# Patient Record
Sex: MALE | Race: WHITE | NOT HISPANIC OR LATINO
[De-identification: names, ages, dates, MRNs, and addresses within clinical notes are randomized per-mention and may not be internally consistent; named-entity substitution may affect disease eponyms.]

---

## 2017-03-01 ENCOUNTER — RX RENEWAL (OUTPATIENT)
Age: 57
End: 2017-03-01

## 2017-04-27 ENCOUNTER — APPOINTMENT (OUTPATIENT)
Dept: CARDIOLOGY | Facility: CLINIC | Age: 57
End: 2017-04-27

## 2017-04-27 LAB — INR PPP: 2.8

## 2017-05-01 VITALS — HEIGHT: 71 IN | HEART RATE: 67 BPM | BODY MASS INDEX: 40.32 KG/M2 | WEIGHT: 288 LBS

## 2017-05-25 ENCOUNTER — APPOINTMENT (OUTPATIENT)
Dept: CARDIOLOGY | Facility: CLINIC | Age: 57
End: 2017-05-25

## 2017-05-25 VITALS — BODY MASS INDEX: 40.32 KG/M2 | WEIGHT: 288 LBS | OXYGEN SATURATION: 96 % | HEIGHT: 71 IN

## 2017-05-25 LAB
INR PPP: 2 RATIO
QUALITY CONTROL: YES

## 2017-06-22 ENCOUNTER — APPOINTMENT (OUTPATIENT)
Dept: CARDIOLOGY | Facility: CLINIC | Age: 57
End: 2017-06-22

## 2017-06-22 LAB — INR PPP: 2.3 RATIO

## 2017-07-24 ENCOUNTER — APPOINTMENT (OUTPATIENT)
Dept: CARDIOLOGY | Facility: CLINIC | Age: 57
End: 2017-07-24

## 2017-07-24 VITALS — WEIGHT: 288 LBS | BODY MASS INDEX: 40.32 KG/M2 | HEIGHT: 71 IN | HEART RATE: 67 BPM

## 2017-07-24 LAB
INR PPP: 2.4 RATIO
QUALITY CONTROL: YES

## 2017-08-28 ENCOUNTER — APPOINTMENT (OUTPATIENT)
Dept: CARDIOLOGY | Facility: CLINIC | Age: 57
End: 2017-08-28
Payer: COMMERCIAL

## 2017-08-28 VITALS — HEIGHT: 71 IN | WEIGHT: 288 LBS | BODY MASS INDEX: 40.32 KG/M2 | HEART RATE: 67 BPM

## 2017-08-28 LAB
INR PPP: 2.1 RATIO
QUALITY CONTROL: YES

## 2017-08-28 PROCEDURE — 99211 OFF/OP EST MAY X REQ PHY/QHP: CPT

## 2017-08-28 PROCEDURE — 85610 PROTHROMBIN TIME: CPT | Mod: QW

## 2017-09-18 ENCOUNTER — RX RENEWAL (OUTPATIENT)
Age: 57
End: 2017-09-18

## 2017-09-25 ENCOUNTER — APPOINTMENT (OUTPATIENT)
Dept: CARDIOLOGY | Facility: CLINIC | Age: 57
End: 2017-09-25

## 2017-12-19 ENCOUNTER — RX RENEWAL (OUTPATIENT)
Age: 57
End: 2017-12-19

## 2017-12-20 ENCOUNTER — RX RENEWAL (OUTPATIENT)
Age: 57
End: 2017-12-20

## 2017-12-29 ENCOUNTER — APPOINTMENT (OUTPATIENT)
Dept: CARDIOLOGY | Facility: CLINIC | Age: 57
End: 2017-12-29
Payer: COMMERCIAL

## 2017-12-29 ENCOUNTER — NON-APPOINTMENT (OUTPATIENT)
Age: 57
End: 2017-12-29

## 2017-12-29 VITALS — BODY MASS INDEX: 41.16 KG/M2 | HEIGHT: 71 IN | WEIGHT: 294 LBS | HEART RATE: 73 BPM

## 2017-12-29 VITALS
HEART RATE: 73 BPM | SYSTOLIC BLOOD PRESSURE: 198 MMHG | BODY MASS INDEX: 41.16 KG/M2 | DIASTOLIC BLOOD PRESSURE: 107 MMHG | OXYGEN SATURATION: 100 % | HEIGHT: 71 IN | WEIGHT: 294 LBS

## 2017-12-29 LAB
INR PPP: 2.3 RATIO
QUALITY CONTROL: YES

## 2017-12-29 PROCEDURE — 85610 PROTHROMBIN TIME: CPT | Mod: QW

## 2017-12-29 PROCEDURE — 93000 ELECTROCARDIOGRAM COMPLETE: CPT

## 2017-12-29 PROCEDURE — 99215 OFFICE O/P EST HI 40 MIN: CPT

## 2018-02-12 ENCOUNTER — RX RENEWAL (OUTPATIENT)
Age: 58
End: 2018-02-12

## 2018-02-14 ENCOUNTER — NON-APPOINTMENT (OUTPATIENT)
Age: 58
End: 2018-02-14

## 2018-02-14 ENCOUNTER — APPOINTMENT (OUTPATIENT)
Dept: CARDIOLOGY | Facility: CLINIC | Age: 58
End: 2018-02-14
Payer: COMMERCIAL

## 2018-02-14 VITALS
HEIGHT: 71 IN | SYSTOLIC BLOOD PRESSURE: 176 MMHG | BODY MASS INDEX: 41.58 KG/M2 | DIASTOLIC BLOOD PRESSURE: 83 MMHG | HEART RATE: 65 BPM | WEIGHT: 297 LBS | OXYGEN SATURATION: 97 %

## 2018-02-14 VITALS — HEIGHT: 71 IN | BODY MASS INDEX: 41.58 KG/M2 | HEART RATE: 65 BPM | WEIGHT: 297 LBS

## 2018-02-14 DIAGNOSIS — R60.0 LOCALIZED EDEMA: ICD-10-CM

## 2018-02-14 LAB
INR PPP: 2.1 RATIO
QUALITY CONTROL: YES

## 2018-02-14 PROCEDURE — 99215 OFFICE O/P EST HI 40 MIN: CPT

## 2018-02-14 PROCEDURE — 93000 ELECTROCARDIOGRAM COMPLETE: CPT

## 2018-02-14 PROCEDURE — 85610 PROTHROMBIN TIME: CPT | Mod: QW

## 2018-02-28 ENCOUNTER — NON-APPOINTMENT (OUTPATIENT)
Age: 58
End: 2018-02-28

## 2018-02-28 ENCOUNTER — APPOINTMENT (OUTPATIENT)
Dept: CARDIOLOGY | Facility: CLINIC | Age: 58
End: 2018-02-28
Payer: COMMERCIAL

## 2018-02-28 VITALS
HEART RATE: 69 BPM | DIASTOLIC BLOOD PRESSURE: 80 MMHG | BODY MASS INDEX: 41.3 KG/M2 | WEIGHT: 295 LBS | OXYGEN SATURATION: 96 % | HEIGHT: 71 IN | SYSTOLIC BLOOD PRESSURE: 170 MMHG

## 2018-02-28 PROCEDURE — 93000 ELECTROCARDIOGRAM COMPLETE: CPT

## 2018-02-28 PROCEDURE — 99215 OFFICE O/P EST HI 40 MIN: CPT

## 2018-03-14 ENCOUNTER — APPOINTMENT (OUTPATIENT)
Dept: CARDIOLOGY | Facility: CLINIC | Age: 58
End: 2018-03-14

## 2018-03-26 ENCOUNTER — APPOINTMENT (OUTPATIENT)
Dept: CARDIOLOGY | Facility: CLINIC | Age: 58
End: 2018-03-26
Payer: COMMERCIAL

## 2018-03-26 VITALS — BODY MASS INDEX: 41.3 KG/M2 | WEIGHT: 295 LBS | HEART RATE: 69 BPM | HEIGHT: 71 IN

## 2018-03-26 LAB
INR PPP: 2.7 RATIO
QUALITY CONTROL: YES

## 2018-03-26 PROCEDURE — 85610 PROTHROMBIN TIME: CPT | Mod: QW

## 2018-03-26 PROCEDURE — 99211 OFF/OP EST MAY X REQ PHY/QHP: CPT

## 2018-04-17 ENCOUNTER — RX RENEWAL (OUTPATIENT)
Age: 58
End: 2018-04-17

## 2018-04-23 ENCOUNTER — APPOINTMENT (OUTPATIENT)
Dept: CARDIOLOGY | Facility: CLINIC | Age: 58
End: 2018-04-23

## 2018-06-25 ENCOUNTER — RX RENEWAL (OUTPATIENT)
Age: 58
End: 2018-06-25

## 2018-07-02 ENCOUNTER — APPOINTMENT (OUTPATIENT)
Dept: CARDIOLOGY | Facility: CLINIC | Age: 58
End: 2018-07-02
Payer: COMMERCIAL

## 2018-07-02 VITALS — WEIGHT: 295 LBS | BODY MASS INDEX: 41.3 KG/M2 | HEART RATE: 69 BPM | HEIGHT: 71 IN

## 2018-07-02 LAB
INR PPP: 2.7 RATIO
QUALITY CONTROL: YES

## 2018-07-02 PROCEDURE — 85610 PROTHROMBIN TIME: CPT | Mod: QW

## 2018-07-02 PROCEDURE — 93793 ANTICOAG MGMT PT WARFARIN: CPT

## 2018-07-05 ENCOUNTER — MEDICATION RENEWAL (OUTPATIENT)
Age: 58
End: 2018-07-05

## 2018-08-02 ENCOUNTER — APPOINTMENT (OUTPATIENT)
Dept: CARDIOLOGY | Facility: CLINIC | Age: 58
End: 2018-08-02

## 2018-08-30 ENCOUNTER — APPOINTMENT (OUTPATIENT)
Dept: CARDIOLOGY | Facility: CLINIC | Age: 58
End: 2018-08-30
Payer: COMMERCIAL

## 2018-08-30 PROCEDURE — 85610 PROTHROMBIN TIME: CPT | Mod: QW

## 2018-08-30 PROCEDURE — 93793 ANTICOAG MGMT PT WARFARIN: CPT

## 2018-08-31 VITALS — BODY MASS INDEX: 41.3 KG/M2 | HEIGHT: 71 IN | HEART RATE: 69 BPM | WEIGHT: 295 LBS

## 2018-08-31 LAB
INR PPP: 2.5 RATIO
QUALITY CONTROL: YES

## 2018-09-26 ENCOUNTER — APPOINTMENT (OUTPATIENT)
Dept: CARDIOLOGY | Facility: CLINIC | Age: 58
End: 2018-09-26

## 2018-10-10 ENCOUNTER — APPOINTMENT (OUTPATIENT)
Dept: CARDIOLOGY | Facility: CLINIC | Age: 58
End: 2018-10-10
Payer: COMMERCIAL

## 2018-10-10 VITALS — BODY MASS INDEX: 41.3 KG/M2 | HEIGHT: 71 IN | HEART RATE: 69 BPM | WEIGHT: 295 LBS

## 2018-10-10 LAB
INR PPP: 2.4 RATIO
QUALITY CONTROL: YES

## 2018-10-10 PROCEDURE — 93793 ANTICOAG MGMT PT WARFARIN: CPT

## 2018-10-10 PROCEDURE — 85610 PROTHROMBIN TIME: CPT | Mod: QW

## 2018-10-11 ENCOUNTER — MEDICATION RENEWAL (OUTPATIENT)
Age: 58
End: 2018-10-11

## 2018-11-07 ENCOUNTER — APPOINTMENT (OUTPATIENT)
Dept: CARDIOLOGY | Facility: CLINIC | Age: 58
End: 2018-11-07

## 2018-11-19 ENCOUNTER — APPOINTMENT (OUTPATIENT)
Dept: CARDIOLOGY | Facility: CLINIC | Age: 58
End: 2018-11-19
Payer: COMMERCIAL

## 2018-11-19 LAB
INR PPP: 2.3 RATIO
QUALITY CONTROL: YES

## 2018-11-19 PROCEDURE — 85610 PROTHROMBIN TIME: CPT | Mod: QW

## 2018-11-19 PROCEDURE — 93793 ANTICOAG MGMT PT WARFARIN: CPT

## 2018-12-19 ENCOUNTER — APPOINTMENT (OUTPATIENT)
Dept: CARDIOLOGY | Facility: CLINIC | Age: 58
End: 2018-12-19

## 2018-12-28 ENCOUNTER — APPOINTMENT (OUTPATIENT)
Dept: CARDIOLOGY | Facility: CLINIC | Age: 58
End: 2018-12-28
Payer: COMMERCIAL

## 2018-12-28 VITALS — HEIGHT: 71 IN | WEIGHT: 295 LBS | BODY MASS INDEX: 41.3 KG/M2 | HEART RATE: 69 BPM

## 2018-12-28 LAB
INR PPP: 1.8 RATIO
QUALITY CONTROL: YES

## 2018-12-28 PROCEDURE — 93793 ANTICOAG MGMT PT WARFARIN: CPT

## 2018-12-28 PROCEDURE — 85610 PROTHROMBIN TIME: CPT | Mod: QW

## 2019-01-03 ENCOUNTER — RX RENEWAL (OUTPATIENT)
Age: 59
End: 2019-01-03

## 2019-01-16 ENCOUNTER — APPOINTMENT (OUTPATIENT)
Dept: CARDIOLOGY | Facility: CLINIC | Age: 59
End: 2019-01-16
Payer: COMMERCIAL

## 2019-01-16 LAB
INR PPP: 3.6 RATIO
QUALITY CONTROL: YES

## 2019-01-16 PROCEDURE — 93793 ANTICOAG MGMT PT WARFARIN: CPT

## 2019-01-16 PROCEDURE — 85610 PROTHROMBIN TIME: CPT | Mod: QW

## 2019-01-23 ENCOUNTER — NON-APPOINTMENT (OUTPATIENT)
Age: 59
End: 2019-01-23

## 2019-01-23 ENCOUNTER — APPOINTMENT (OUTPATIENT)
Dept: CARDIOLOGY | Facility: CLINIC | Age: 59
End: 2019-01-23
Payer: COMMERCIAL

## 2019-01-23 VITALS
WEIGHT: 289 LBS | SYSTOLIC BLOOD PRESSURE: 192 MMHG | HEIGHT: 71 IN | HEART RATE: 75 BPM | OXYGEN SATURATION: 99 % | BODY MASS INDEX: 40.46 KG/M2 | DIASTOLIC BLOOD PRESSURE: 80 MMHG

## 2019-01-23 PROCEDURE — 93000 ELECTROCARDIOGRAM COMPLETE: CPT

## 2019-01-23 PROCEDURE — 99215 OFFICE O/P EST HI 40 MIN: CPT

## 2019-01-23 RX ORDER — LOSARTAN POTASSIUM 50 MG/1
50 TABLET, FILM COATED ORAL
Qty: 90 | Refills: 3 | Status: DISCONTINUED | COMMUNITY
Start: 2017-12-29 | End: 2019-01-23

## 2019-01-23 RX ORDER — FUROSEMIDE 80 MG/1
80 TABLET ORAL
Qty: 90 | Refills: 3 | Status: DISCONTINUED | COMMUNITY
Start: 2018-02-14 | End: 2018-11-23

## 2019-01-23 RX ORDER — LOSARTAN POTASSIUM 50 MG/1
50 TABLET, FILM COATED ORAL
Qty: 90 | Refills: 3 | Status: DISCONTINUED | COMMUNITY
Start: 2018-06-25 | End: 2019-01-23

## 2019-01-23 NOTE — CARDIOLOGY SUMMARY
[No Ischemia] : no Ischemia [LVEF ___%] : LVEF [unfilled]% [None] : no pulmonary hypertension [Enlarged] : enlarged LA size [Mild] : mild mitral regurgitation [___] : [unfilled]

## 2019-01-23 NOTE — HISTORY OF PRESENT ILLNESS
[FreeTextEntry1] : Thierno Hassan presented to the office today for a followup evaluation. He was last seen in the office 1 year ago.\par \par He is now 58 years old, with a history of multiple medical problems. He has a history of coronary artery disease, status post PCI as well as coronary bypass. His coronary bypass was in 2007, and he has had PCI in 2009, with a bare-metal stents placed to the saphenous vein graft to his ramus intermedius. He has a history of paroxysmal atrial fibrillation, generally maintained in sinus rhythm on warfarin and sotalol. In 2016, he had increasingly frequent episodes of atrial fibrillation, but did not stabilize. He continued to have an intermittently tachycardic ventricular response despite the addition of diltiazem, which he did not like very much. Eventually, he was admitted to the hospital, and was referred to [facility] where he had pulmonary vein isolation.\par \par Because of recurrent tachycardia, he was started on diltiazem. Over time, he developed atrial flutter. This was relatively frequent initially after his ablation, but had seemingly become less frequent. He had symptoms suggesting tachy lara syndrome, but he never went to electrophysiology. This went away on its own, and he now refuses to take diltiazem because he feels that this caused his issues overall.\par \par He has been going to wound care over the last several months because of wounds on both legs. They seem to be healing. They have noticed that his blood pressure is elevated. It has been in the range of 170 at least.\par \par Thierno reports a degree of chest tightness when he exerts himself in the cold weather. This is really nothing new. It is certainly something that has been consistent with angina, over a period of time. He has a tendency toward edema, likely on the basis of venous insufficiency. He has been only partially compliant with his medications, as he has been taking half doses of sotalol twice daily. He does not really report much in the way of palpitations, and believes the last episode of palpitations was some months ago.

## 2019-01-23 NOTE — PHYSICAL EXAM
[General Appearance - Well Developed] : well developed [Normal Appearance] : normal appearance [Well Groomed] : well groomed [General Appearance - Well Nourished] : well nourished [No Deformities] : no deformities [General Appearance - In No Acute Distress] : no acute distress [Normal Conjunctiva] : the conjunctiva exhibited no abnormalities [Eyelids - No Xanthelasma] : the eyelids demonstrated no xanthelasmas [Normal Oral Mucosa] : normal oral mucosa [No Oral Pallor] : no oral pallor [No Oral Cyanosis] : no oral cyanosis [Normal Jugular Venous A Waves Present] : normal jugular venous A waves present [Normal Jugular Venous V Waves Present] : normal jugular venous V waves present [No Jugular Venous Rose A Waves] : no jugular venous rose A waves [Respiration, Rhythm And Depth] : normal respiratory rhythm and effort [Exaggerated Use Of Accessory Muscles For Inspiration] : no accessory muscle use [Auscultation Breath Sounds / Voice Sounds] : lungs were clear to auscultation bilaterally [Abdomen Soft] : soft [Abdomen Tenderness] : non-tender [Abdomen Mass (___ Cm)] : no abdominal mass palpated [Abnormal Walk] : normal gait [Gait - Sufficient For Exercise Testing] : the gait was sufficient for exercise testing [Nail Clubbing] : no clubbing of the fingernails [Cyanosis, Localized] : no localized cyanosis [Petechial Hemorrhages (___cm)] : no petechial hemorrhages [Skin Color & Pigmentation] : normal skin color and pigmentation [] : no rash [No Venous Stasis] : no venous stasis [Skin Lesions] : no skin lesions [No Skin Ulcers] : no skin ulcer [No Xanthoma] : no  xanthoma was observed [Oriented To Time, Place, And Person] : oriented to person, place, and time [Affect] : the affect was normal [Mood] : the mood was normal [No Anxiety] : not feeling anxious [Normal] : normal [Normal Rate] : normal [Rhythm Regular] : regular [Normal S1] : normal S1 [Normal S2] : normal S2 [No Gallop] : no gallop heard [No Murmur] : no murmurs heard [2+] : left 2+ [___ +] : bilateral [unfilled]U+ pretibial pitting edema [FreeTextEntry1] : Obese [S3] : no S3 [S4] : no S4 [Right Carotid Bruit] : no bruit heard over the right carotid [Left Carotid Bruit] : no bruit heard over the left carotid [Right Femoral Bruit] : no bruit heard over the right femoral artery [Left Femoral Bruit] : no bruit heard over the left femoral artery [Bruit] : no bruit heard

## 2019-01-23 NOTE — DISCUSSION/SUMMARY
[FreeTextEntry1] : Thierno has multiple medical problems, including premature atherosclerotic heart disease and paroxysmal atrial fibrillation. More recently, he had pulmonary vein isolation.\par \par His blood pressure remains elevated.\par \par Compliance has been an issue as long as I know him. I have emphasized the fact him going to add amlodipine 5 mg daily. That is not going to be his one and only medication, and he should not discontinue anything on his own, noting that he has discontinued or modified almost every medicine ever given him. I have emphasized that his compliance is a major obstacle to his health, and adverse outcomes are guaranteed if he does not take care of himself better.\par \par He will take the full dose of sotalol.\par \par He'll come back in one month. We will discuss the possibility of additional testing including an echocardiogram and stress test, at that time. We discussed the fact that it has been many years since his last assessment, and that examinations are indicated.

## 2019-01-23 NOTE — REASON FOR VISIT
[Follow-Up - Clinic] : a clinic follow-up of [Atrial Fibrillation] : atrial fibrillation [Coronary Artery Disease] : coronary artery disease [Dyspnea] : dyspnea [Hypertension] : hypertension

## 2019-02-04 ENCOUNTER — RX RENEWAL (OUTPATIENT)
Age: 59
End: 2019-02-04

## 2019-02-06 ENCOUNTER — APPOINTMENT (OUTPATIENT)
Dept: CARDIOLOGY | Facility: CLINIC | Age: 59
End: 2019-02-06
Payer: COMMERCIAL

## 2019-02-06 PROCEDURE — 93793 ANTICOAG MGMT PT WARFARIN: CPT

## 2019-02-06 PROCEDURE — 85610 PROTHROMBIN TIME: CPT | Mod: QW

## 2019-02-13 VITALS — BODY MASS INDEX: 40.46 KG/M2 | WEIGHT: 289 LBS | HEIGHT: 71 IN | HEART RATE: 75 BPM

## 2019-02-13 LAB
INR PPP: 3.2 RATIO
QUALITY CONTROL: YES

## 2019-02-20 ENCOUNTER — APPOINTMENT (OUTPATIENT)
Dept: CARDIOLOGY | Facility: CLINIC | Age: 59
End: 2019-02-20

## 2019-02-25 ENCOUNTER — APPOINTMENT (OUTPATIENT)
Dept: CARDIOLOGY | Facility: CLINIC | Age: 59
End: 2019-02-25
Payer: COMMERCIAL

## 2019-02-25 ENCOUNTER — NON-APPOINTMENT (OUTPATIENT)
Age: 59
End: 2019-02-25

## 2019-02-25 VITALS
DIASTOLIC BLOOD PRESSURE: 96 MMHG | HEART RATE: 76 BPM | OXYGEN SATURATION: 97 % | SYSTOLIC BLOOD PRESSURE: 174 MMHG | HEIGHT: 71 IN | BODY MASS INDEX: 41.02 KG/M2 | WEIGHT: 293 LBS

## 2019-02-25 LAB — INR PPP: 2.5

## 2019-02-25 PROCEDURE — 93000 ELECTROCARDIOGRAM COMPLETE: CPT

## 2019-02-25 PROCEDURE — 99215 OFFICE O/P EST HI 40 MIN: CPT

## 2019-02-25 NOTE — HISTORY OF PRESENT ILLNESS
[FreeTextEntry1] : Thierno Hassan presented to the office today for a followup evaluation. He was last seen in the office 1 month ago.\par \par He is now 58 years old, with a history of multiple medical problems. He has a history of coronary artery disease, status post PCI as well as coronary bypass. His coronary bypass was in 2007, and he has had PCI in 2009, with a bare-metal stents placed to the saphenous vein graft to his ramus intermedius. He has a history of paroxysmal atrial fibrillation, generally maintained in sinus rhythm on warfarin and sotalol. In 2016, he had increasingly frequent episodes of atrial fibrillation, but did not stabilize. He continued to have an intermittently tachycardic ventricular response despite the addition of diltiazem, which he did not like very much. Eventually, he was admitted to the hospital, and was referred to [facility] where he had pulmonary vein isolation.\par \par Because of recurrent tachycardia, he was started on diltiazem. Over time, he developed atrial flutter. This was relatively frequent initially after his ablation, but had seemingly become less frequent. He had symptoms suggesting tachy lara syndrome, but he never went to electrophysiology. This went away on its own, and he now refuses to take diltiazem because he feels that this caused his issues overall.\par \par He has been going to wound care over the last several months because of wounds on both legs. They seem to be healing. They have noticed that his blood pressure is elevated. It has been in the range of 170 at least.\par \par At the last visit we started amlodipine 5 mg. I also asked that he take his prescribed dose of sotalol.  He has been taking the amlodipine but is still taking a half dose of sotalol.  Thierno still reports a degree of chest tightness when he exerts himself in the cold weather. This is really nothing new. It is certainly something that has been consistent with angina, over a period of time. He has a tendency toward edema, likely on the basis of venous insufficiency.  He does not really report much in the way of palpitations.  His blood pressure elsewhere was recently checked and apparently normal.

## 2019-02-25 NOTE — DISCUSSION/SUMMARY
[FreeTextEntry1] : Thierno has multiple medical problems, including premature atherosclerotic heart disease and paroxysmal atrial fibrillation. More recently, he had pulmonary vein isolation.\par \par His blood pressure remains elevated.\par \par Blood pressure is somewhat unclear. I gave him an option to perform an ambulatory 24-hour blood pressure monitor ordered check things at home. He prefers to check things at home. Hopefully he will follow through if his blood pressure is elevated, as I suspect we will find, his medications will be adjusted. He will schedule an echocardiogram which has not been performed for more than 4 years.\par \par His INR was checked today, and was therapeutic.\par \par He will see me in one month.

## 2019-02-25 NOTE — PHYSICAL EXAM
[General Appearance - Well Developed] : well developed [Normal Appearance] : normal appearance [Well Groomed] : well groomed [General Appearance - Well Nourished] : well nourished [No Deformities] : no deformities [General Appearance - In No Acute Distress] : no acute distress [Normal Conjunctiva] : the conjunctiva exhibited no abnormalities [Eyelids - No Xanthelasma] : the eyelids demonstrated no xanthelasmas [Normal Oral Mucosa] : normal oral mucosa [No Oral Pallor] : no oral pallor [No Oral Cyanosis] : no oral cyanosis [Normal Jugular Venous A Waves Present] : normal jugular venous A waves present [Normal Jugular Venous V Waves Present] : normal jugular venous V waves present [No Jugular Venous Rose A Waves] : no jugular venous rose A waves [Respiration, Rhythm And Depth] : normal respiratory rhythm and effort [Exaggerated Use Of Accessory Muscles For Inspiration] : no accessory muscle use [Auscultation Breath Sounds / Voice Sounds] : lungs were clear to auscultation bilaterally [Abdomen Soft] : soft [Abdomen Tenderness] : non-tender [Abdomen Mass (___ Cm)] : no abdominal mass palpated [FreeTextEntry1] : Obese [Abnormal Walk] : normal gait [Gait - Sufficient For Exercise Testing] : the gait was sufficient for exercise testing [Nail Clubbing] : no clubbing of the fingernails [Cyanosis, Localized] : no localized cyanosis [Petechial Hemorrhages (___cm)] : no petechial hemorrhages [Skin Color & Pigmentation] : normal skin color and pigmentation [] : no rash [No Venous Stasis] : no venous stasis [Skin Lesions] : no skin lesions [No Skin Ulcers] : no skin ulcer [No Xanthoma] : no  xanthoma was observed [Oriented To Time, Place, And Person] : oriented to person, place, and time [Affect] : the affect was normal [Mood] : the mood was normal [No Anxiety] : not feeling anxious [Normal] : normal [Normal Rate] : normal [Rhythm Regular] : regular [Normal S1] : normal S1 [Normal S2] : normal S2 [No Gallop] : no gallop heard [S3] : no S3 [S4] : no S4 [No Murmur] : no murmurs heard [2+] : left 2+ [Right Carotid Bruit] : no bruit heard over the right carotid [Left Carotid Bruit] : no bruit heard over the left carotid [Right Femoral Bruit] : no bruit heard over the right femoral artery [Left Femoral Bruit] : no bruit heard over the left femoral artery [Bruit] : no bruit heard [___ +] : bilateral [unfilled]U+ pretibial pitting edema

## 2019-03-27 ENCOUNTER — APPOINTMENT (OUTPATIENT)
Dept: CARDIOLOGY | Facility: CLINIC | Age: 59
End: 2019-03-27

## 2019-04-08 ENCOUNTER — RX RENEWAL (OUTPATIENT)
Age: 59
End: 2019-04-08

## 2019-04-12 LAB — INR PPP: 2.82

## 2019-05-16 ENCOUNTER — OTHER (OUTPATIENT)
Age: 59
End: 2019-05-16

## 2019-05-16 LAB — INR PPP: 2.1

## 2019-07-15 LAB — INR PPP: 2.1

## 2019-09-18 LAB — INR PPP: 2.3

## 2019-09-20 ENCOUNTER — MEDICATION RENEWAL (OUTPATIENT)
Age: 59
End: 2019-09-20

## 2019-10-10 ENCOUNTER — OTHER (OUTPATIENT)
Age: 59
End: 2019-10-10

## 2019-10-13 LAB — INR PPP: 2.1

## 2019-11-25 ENCOUNTER — APPOINTMENT (OUTPATIENT)
Dept: CARDIOLOGY | Facility: CLINIC | Age: 59
End: 2019-11-25

## 2019-12-13 LAB — INR PPP: 2.7

## 2020-01-08 ENCOUNTER — APPOINTMENT (OUTPATIENT)
Dept: CARDIOLOGY | Facility: CLINIC | Age: 60
End: 2020-01-08
Payer: COMMERCIAL

## 2020-01-08 ENCOUNTER — NON-APPOINTMENT (OUTPATIENT)
Age: 60
End: 2020-01-08

## 2020-01-08 VITALS
BODY MASS INDEX: 38.36 KG/M2 | HEIGHT: 71 IN | OXYGEN SATURATION: 99 % | HEART RATE: 66 BPM | WEIGHT: 274 LBS | SYSTOLIC BLOOD PRESSURE: 181 MMHG | DIASTOLIC BLOOD PRESSURE: 101 MMHG

## 2020-01-08 PROCEDURE — 99214 OFFICE O/P EST MOD 30 MIN: CPT

## 2020-01-08 PROCEDURE — 93000 ELECTROCARDIOGRAM COMPLETE: CPT

## 2020-01-08 NOTE — HISTORY OF PRESENT ILLNESS
[FreeTextEntry1] : Thierno Hassan presented to the office today for a followup evaluation. He was last seen in the office 10 months ago.\par \par He is now 59 years old, with a history of multiple medical problems. He has a history of coronary artery disease, status post PCI as well as coronary bypass. His coronary bypass was in , and he has had PCI in , with a bare-metal stents placed to the saphenous vein graft to his ramus intermedius. He has a history of paroxysmal atrial fibrillation, generally maintained in sinus rhythm on warfarin and sotalol. In 2016, he had increasingly frequent episodes of atrial fibrillation, but did not stabilize. He continued to have an intermittently tachycardic ventricular response despite the addition of diltiazem, which he did not like very much. Eventually, he was admitted to the hospital, and was referred to [facility] where he had pulmonary vein isolation.\par \par Because of recurrent tachycardia, he was started on diltiazem. Over time, he developed atrial flutter. This was relatively frequent initially after his ablation, but had seemingly become less frequent. He had symptoms suggesting tachy lara syndrome, but he never went to electrophysiology. This went away on its own, and he now refuses to take diltiazem because he feels that this caused his issues overall.\par \par At the time of the last visit, he was continuing to report chest tightness with activity, reported for many years. He had nonhealing wounds on his legs, which had subsequently healed. He was not taking appropriate doses of medication, despite my request. I have recommended an echocardiogram at the conclusion of the last visit. He did not schedule it.  He has avoided the entire issue, and has not been back to the office in about 10 months.\par \par He continues to report all the same symptoms. He has been even more depressed than usual, as his father  following a fall, about 2 weeks ago. His blood pressure has been very labile. His blood pressure machine says that his blood pressures frequently in the range of 100, and frequently in the range of 160.

## 2020-01-08 NOTE — CARDIOLOGY SUMMARY
[No Ischemia] : no Ischemia [LVEF ___%] : LVEF [unfilled]% [None] : normal LV function [Mild] : mild mitral regurgitation [Enlarged] : enlarged LA size [___] : [unfilled]

## 2020-01-08 NOTE — REASON FOR VISIT
[Follow-Up - Clinic] : a clinic follow-up of [Coronary Artery Disease] : coronary artery disease [Atrial Fibrillation] : atrial fibrillation [Dyspnea] : dyspnea [Hypertension] : hypertension

## 2020-01-08 NOTE — PHYSICAL EXAM
[Normal Appearance] : normal appearance [General Appearance - Well Developed] : well developed [Well Groomed] : well groomed [No Deformities] : no deformities [General Appearance - Well Nourished] : well nourished [General Appearance - In No Acute Distress] : no acute distress [Normal Conjunctiva] : the conjunctiva exhibited no abnormalities [Normal Oral Mucosa] : normal oral mucosa [Eyelids - No Xanthelasma] : the eyelids demonstrated no xanthelasmas [No Oral Pallor] : no oral pallor [No Oral Cyanosis] : no oral cyanosis [Normal Jugular Venous A Waves Present] : normal jugular venous A waves present [Normal Jugular Venous V Waves Present] : normal jugular venous V waves present [No Jugular Venous Rose A Waves] : no jugular venous rose A waves [Respiration, Rhythm And Depth] : normal respiratory rhythm and effort [Exaggerated Use Of Accessory Muscles For Inspiration] : no accessory muscle use [Auscultation Breath Sounds / Voice Sounds] : lungs were clear to auscultation bilaterally [Abdomen Soft] : soft [Abdomen Tenderness] : non-tender [FreeTextEntry1] : Obese [Abdomen Mass (___ Cm)] : no abdominal mass palpated [Gait - Sufficient For Exercise Testing] : the gait was sufficient for exercise testing [Abnormal Walk] : normal gait [Cyanosis, Localized] : no localized cyanosis [Nail Clubbing] : no clubbing of the fingernails [Petechial Hemorrhages (___cm)] : no petechial hemorrhages [Skin Color & Pigmentation] : normal skin color and pigmentation [] : no rash [No Venous Stasis] : no venous stasis [Skin Lesions] : no skin lesions [No Skin Ulcers] : no skin ulcer [No Xanthoma] : no  xanthoma was observed [Affect] : the affect was normal [Oriented To Time, Place, And Person] : oriented to person, place, and time [Mood] : the mood was normal [No Anxiety] : not feeling anxious [Normal] : normal [Normal Rate] : normal [Rhythm Regular] : regular [Normal S1] : normal S1 [Normal S2] : normal S2 [No Gallop] : no gallop heard [S3] : no S3 [S4] : no S4 [No Murmur] : no murmurs heard [2+] : left 2+ [Left Carotid Bruit] : no bruit heard over the left carotid [Right Carotid Bruit] : no bruit heard over the right carotid [Right Femoral Bruit] : no bruit heard over the right femoral artery [Bruit] : no bruit heard [Left Femoral Bruit] : no bruit heard over the left femoral artery [___ +] : bilateral [unfilled]U+ pretibial pitting edema

## 2020-01-08 NOTE — DISCUSSION/SUMMARY
[FreeTextEntry1] : Thierno has multiple medical problems, including premature atherosclerotic heart disease and paroxysmal atrial fibrillation. More recently, he had pulmonary vein isolation.\par \par His blood pressure remains elevated.\par \par Blood pressure is somewhat unclear. I gave him an option to perform an ambulatory 24-hour blood pressure monitor ordered check things at home. He prefers to check things at home. Hopefully he will follow through.  \par \par I have ordered the echocardiogram again. We will see if he decides to follow through with it. Is not taking the right dose of sotalol, but has not had any palpitations. I have encouraged him to take the right dose of all his medications.\par \par His lack of compliance makes managing him almost impossible.

## 2020-01-15 ENCOUNTER — RX RENEWAL (OUTPATIENT)
Age: 60
End: 2020-01-15

## 2020-01-18 ENCOUNTER — APPOINTMENT (OUTPATIENT)
Dept: CARDIOLOGY | Facility: CLINIC | Age: 60
End: 2020-01-18
Payer: COMMERCIAL

## 2020-01-18 PROCEDURE — 93306 TTE W/DOPPLER COMPLETE: CPT

## 2020-01-29 LAB — INR PPP: 2.3

## 2020-02-10 ENCOUNTER — RX RENEWAL (OUTPATIENT)
Age: 60
End: 2020-02-10

## 2020-03-17 ENCOUNTER — RX RENEWAL (OUTPATIENT)
Age: 60
End: 2020-03-17

## 2020-03-18 ENCOUNTER — RX RENEWAL (OUTPATIENT)
Age: 60
End: 2020-03-18

## 2020-03-27 ENCOUNTER — RX RENEWAL (OUTPATIENT)
Age: 60
End: 2020-03-27

## 2020-03-31 LAB — INR PPP: 2.1

## 2020-04-10 ENCOUNTER — EMERGENCY (EMERGENCY)
Facility: HOSPITAL | Age: 60
LOS: 1 days | Discharge: HOME | End: 2020-04-10
Attending: EMERGENCY MEDICINE | Admitting: EMERGENCY MEDICINE
Payer: COMMERCIAL

## 2020-04-10 VITALS
RESPIRATION RATE: 20 BRPM | HEART RATE: 75 BPM | TEMPERATURE: 99 F | HEIGHT: 70 IN | WEIGHT: 285.06 LBS | DIASTOLIC BLOOD PRESSURE: 78 MMHG | SYSTOLIC BLOOD PRESSURE: 156 MMHG | OXYGEN SATURATION: 96 %

## 2020-04-10 VITALS
SYSTOLIC BLOOD PRESSURE: 142 MMHG | OXYGEN SATURATION: 97 % | DIASTOLIC BLOOD PRESSURE: 68 MMHG | HEART RATE: 68 BPM | RESPIRATION RATE: 18 BRPM

## 2020-04-10 DIAGNOSIS — Z95.1 PRESENCE OF AORTOCORONARY BYPASS GRAFT: Chronic | ICD-10-CM

## 2020-04-10 LAB
ALBUMIN SERPL ELPH-MCNC: 3.3 G/DL — SIGNIFICANT CHANGE UP (ref 3.3–5)
ALP SERPL-CCNC: 60 U/L — SIGNIFICANT CHANGE UP (ref 30–120)
ALT FLD-CCNC: 38 U/L DA — SIGNIFICANT CHANGE UP (ref 10–60)
ANION GAP SERPL CALC-SCNC: 12 MMOL/L — SIGNIFICANT CHANGE UP (ref 5–17)
AST SERPL-CCNC: 76 U/L — HIGH (ref 10–40)
BILIRUB SERPL-MCNC: 1.7 MG/DL — HIGH (ref 0.2–1.2)
BUN SERPL-MCNC: 26 MG/DL — HIGH (ref 7–23)
CALCIUM SERPL-MCNC: 8.5 MG/DL — SIGNIFICANT CHANGE UP (ref 8.4–10.5)
CHLORIDE SERPL-SCNC: 97 MMOL/L — SIGNIFICANT CHANGE UP (ref 96–108)
CO2 SERPL-SCNC: 20 MMOL/L — LOW (ref 22–31)
CREAT SERPL-MCNC: 1.35 MG/DL — HIGH (ref 0.5–1.3)
D DIMER BLD IA.RAPID-MCNC: <150 NG/ML DDU — SIGNIFICANT CHANGE UP
GLUCOSE SERPL-MCNC: 150 MG/DL — HIGH (ref 70–99)
HCT VFR BLD CALC: 34.2 % — LOW (ref 39–50)
HGB BLD-MCNC: 11.7 G/DL — LOW (ref 13–17)
LIDOCAIN IGE QN: 227 U/L — SIGNIFICANT CHANGE UP (ref 73–393)
MAGNESIUM SERPL-MCNC: 2.1 MG/DL — SIGNIFICANT CHANGE UP (ref 1.6–2.6)
MCHC RBC-ENTMCNC: 30.5 PG — SIGNIFICANT CHANGE UP (ref 27–34)
MCHC RBC-ENTMCNC: 34.2 GM/DL — SIGNIFICANT CHANGE UP (ref 32–36)
MCV RBC AUTO: 89.3 FL — SIGNIFICANT CHANGE UP (ref 80–100)
NRBC # BLD: 0 /100 WBCS — SIGNIFICANT CHANGE UP (ref 0–0)
NT-PROBNP SERPL-SCNC: 1162 PG/ML — HIGH (ref 0–125)
PLATELET # BLD AUTO: 117 K/UL — LOW (ref 150–400)
POTASSIUM SERPL-MCNC: 4.5 MMOL/L — SIGNIFICANT CHANGE UP (ref 3.5–5.3)
POTASSIUM SERPL-SCNC: 4.5 MMOL/L — SIGNIFICANT CHANGE UP (ref 3.5–5.3)
PROT SERPL-MCNC: 8 G/DL — SIGNIFICANT CHANGE UP (ref 6–8.3)
RBC # BLD: 3.83 M/UL — LOW (ref 4.2–5.8)
RBC # FLD: 13.7 % — SIGNIFICANT CHANGE UP (ref 10.3–14.5)
SODIUM SERPL-SCNC: 129 MMOL/L — LOW (ref 135–145)
TROPONIN I SERPL-MCNC: 0 NG/ML — LOW (ref 0.02–0.06)
WBC # BLD: 7.1 K/UL — SIGNIFICANT CHANGE UP (ref 3.8–10.5)
WBC # FLD AUTO: 7.1 K/UL — SIGNIFICANT CHANGE UP (ref 3.8–10.5)

## 2020-04-10 PROCEDURE — 99284 EMERGENCY DEPT VISIT MOD MDM: CPT | Mod: 25

## 2020-04-10 PROCEDURE — 84484 ASSAY OF TROPONIN QUANT: CPT

## 2020-04-10 PROCEDURE — 85027 COMPLETE CBC AUTOMATED: CPT

## 2020-04-10 PROCEDURE — 93010 ELECTROCARDIOGRAM REPORT: CPT

## 2020-04-10 PROCEDURE — 99285 EMERGENCY DEPT VISIT HI MDM: CPT

## 2020-04-10 PROCEDURE — 71045 X-RAY EXAM CHEST 1 VIEW: CPT | Mod: 26

## 2020-04-10 PROCEDURE — 83690 ASSAY OF LIPASE: CPT

## 2020-04-10 PROCEDURE — 83735 ASSAY OF MAGNESIUM: CPT

## 2020-04-10 PROCEDURE — 36415 COLL VENOUS BLD VENIPUNCTURE: CPT

## 2020-04-10 PROCEDURE — 96374 THER/PROPH/DIAG INJ IV PUSH: CPT

## 2020-04-10 PROCEDURE — 83880 ASSAY OF NATRIURETIC PEPTIDE: CPT

## 2020-04-10 PROCEDURE — 71045 X-RAY EXAM CHEST 1 VIEW: CPT

## 2020-04-10 PROCEDURE — 80053 COMPREHEN METABOLIC PANEL: CPT

## 2020-04-10 PROCEDURE — 93005 ELECTROCARDIOGRAM TRACING: CPT

## 2020-04-10 PROCEDURE — 85379 FIBRIN DEGRADATION QUANT: CPT

## 2020-04-10 RX ORDER — ACETAMINOPHEN 500 MG
650 TABLET ORAL ONCE
Refills: 0 | Status: COMPLETED | OUTPATIENT
Start: 2020-04-10 | End: 2020-04-10

## 2020-04-10 RX ORDER — SODIUM CHLORIDE 9 MG/ML
500 INJECTION INTRAMUSCULAR; INTRAVENOUS; SUBCUTANEOUS ONCE
Refills: 0 | Status: COMPLETED | OUTPATIENT
Start: 2020-04-10 | End: 2020-04-10

## 2020-04-10 RX ORDER — METOCLOPRAMIDE HCL 10 MG
10 TABLET ORAL ONCE
Refills: 0 | Status: COMPLETED | OUTPATIENT
Start: 2020-04-10 | End: 2020-04-10

## 2020-04-10 RX ORDER — ASPIRIN/CALCIUM CARB/MAGNESIUM 324 MG
162 TABLET ORAL ONCE
Refills: 0 | Status: DISCONTINUED | OUTPATIENT
Start: 2020-04-10 | End: 2020-04-14

## 2020-04-10 RX ADMIN — Medication 10 MILLIGRAM(S): at 12:51

## 2020-04-10 RX ADMIN — Medication 650 MILLIGRAM(S): at 12:50

## 2020-04-10 RX ADMIN — SODIUM CHLORIDE 500 MILLILITER(S): 9 INJECTION INTRAMUSCULAR; INTRAVENOUS; SUBCUTANEOUS at 12:51

## 2020-04-10 NOTE — ED ADULT NURSE REASSESSMENT NOTE - NS ED NURSE REASSESS COMMENT FT1
pt comfortable  pt re evaluated by md and to be d'c/d  iv d'c/d  no s/s infiltration upon removal  pt discharged stable and ambulatory in nad at present d/c instruction reinforced and pt verbalized understanding vital signs as charted  pt given CDC d/c instructions for covid 19 and advised to keep track of s/s, pt advised to get plenty of sleep, drink plenty of fluids, social distance, self isolate for fever and cough. don't share towels or food with anyone, no kissing, avoid contact with pets, throw tissues in garbage, cover cough  and return to ED for shortness of breath and cough and pt verbalized understanding  pt given referrals from [facility] mandated for information on substance abuse  information and resources as well as information on depression and resources and office of mental health and  pt verbalized understanding

## 2020-04-10 NOTE — ED ADULT NURSE NOTE - OBJECTIVE STATEMENT
59 y/o male received aox3 ambulatory c/o shortness of breath, headache and diarrhea x1 week. pt has been tested positive for covid19 due to exposure to co worker.

## 2020-04-10 NOTE — ED PROVIDER NOTE - PMH
Afib    CAD (coronary artery disease)    Diabetes mellitus    Diabetic neuropathy    High cholesterol    Hypertension    Myocardial infarct    Ulcer of left lower extremity

## 2020-04-10 NOTE — ED PROVIDER NOTE - NSFOLLOWUPINSTRUCTIONS_ED_ALL_ED_FT
1. No signs of emergency medical condition on today's workup.  Presumptive diagnosis made, but further evaluation may be required by your primary care doctor or specialist for a definitive diagnosis.  Therefore, follow up as directed and if symptoms change/worsen or any emergency conditions, please return to the ER.     *** You likely have a viral infection such as coronavirus which can cause infection of the lungs and GI tract. This infection can last 2 weeks and can worsen after the first week. Recommend the following: Tylenol 1000mg every 6 hours for pains/fevers/aches, fluids about 2 liters a day (don't exceed one gallon unless profuse diarrhea), lay on your stomach to help with breathing especially while sleeping, stay in bed as much as possible with slow movements when walking.  ****Return to ED for concerns but especially for: shortness of breath at rest (you will feel short of breath with exertion, this can be normal), feeling like passing out, confusion, or persistent vomiting.

## 2020-04-10 NOTE — ED PROVIDER NOTE - OBJECTIVE STATEMENT
Dr. Sheffield Note: 60M on day 12 of illness with worsening dyspnea and chest tightness for one day, worse with exertion, better with rest, assoc with fever, cough, diarrhea 8 times a day, and nausea.  No syncope, resting chest pain, wet cough, fluid overload.  Wife with viral sxs as well and in ED.  H/o CABG *4, HTN, DM, arrythmia.

## 2020-04-10 NOTE — ED PROVIDER NOTE - CLINICAL SUMMARY MEDICAL DECISION MAKING FREE TEXT BOX
Dr. Sheffield Note: typical viral type symptoms c/w covid with now worsening dyspnea and chest tightness, likely still related to viral illness, very low risk for PE given already anticoagulated, would screen for other complications and r/o ACS event and likely dc otherwise on antibx and outpt observation.

## 2020-04-10 NOTE — ED PROVIDER NOTE - PATIENT PORTAL LINK FT
You can access the FollowMyHealth Patient Portal offered by [facility] by registering at the following website: [email or web address]. By joining [employer]’s FollowMyHealth portal, you will also be able to view your health information using other applications (apps) compatible with our system.

## 2020-04-10 NOTE — ED PROVIDER NOTE - SECONDARY DIAGNOSIS.
Scheduled procedure with Patient at      Telephone Information:   Mobile [phone]      Scheduled Via: Case Request Order for  [facility]   Procedure date: 1/20/2020   Procedure time: 8:00 AM ; Did patient request this specific time? Yes    -If non-ambulatory location, select reason: Not applicable  -Did patient request a specific facility other than MD's preferred facility? No; If so, which facility? n/a  -If a MAC pt is scheduled at [facility], pt was notified a family member/friend is need to stay with the patient over night after their procedure: n/a    Rep Contacted?: n/a  Notified patient about receiving an animated [place] program? Yes    The following have been confirmed:  Insurance name confirmed as [email or web address], will be the same at time of procedure?: Yes Ins Accepted at Facility? Yes  Latex Allergy No  Diabetic No  Sleep Apnea No  Diuretic/Water pill No  Defibrillator/Pacemaker No  MRSA hx No  Blood thinners: Coumadin (Warfarin) or Plavix No      Aspirin No      Phentermine (diet pill) No    Pre-Op testing required No, Patient informed NA  Prep required? Yes, Pharmacy is [facility] on [address]; Briefly reviewed? Yes; Prep cost range discussed? No  If procedure is scheduled 7 days or less, patient was told to  prep letter?: n/a       Chest pain

## 2020-06-08 ENCOUNTER — APPOINTMENT (OUTPATIENT)
Dept: CARDIOLOGY | Facility: CLINIC | Age: 60
End: 2020-06-08

## 2020-10-13 ENCOUNTER — RX RENEWAL (OUTPATIENT)
Age: 60
End: 2020-10-13

## 2020-11-05 NOTE — ED ADULT NURSE NOTE - NS ED NURSE RECORD ANOTHER VITAL SIGN
Yes Glycopyrrolate Counseling:  I discussed with the patient the risks of glycopyrrolate including but not limited to skin rash, drowsiness, dry mouth, difficulty urinating, and blurred vision.

## 2021-01-11 ENCOUNTER — RX RENEWAL (OUTPATIENT)
Age: 61
End: 2021-01-11

## 2021-01-19 ENCOUNTER — RX RENEWAL (OUTPATIENT)
Age: 61
End: 2021-01-19

## 2021-02-04 ENCOUNTER — RX RENEWAL (OUTPATIENT)
Age: 61
End: 2021-02-04

## 2021-03-25 ENCOUNTER — RX RENEWAL (OUTPATIENT)
Age: 61
End: 2021-03-25

## 2021-03-25 ENCOUNTER — NON-APPOINTMENT (OUTPATIENT)
Age: 61
End: 2021-03-25

## 2021-11-10 DIAGNOSIS — I48.91 UNSPECIFIED ATRIAL FIBRILLATION: ICD-10-CM

## 2021-11-10 DIAGNOSIS — Z79.01 LONG TERM (CURRENT) USE OF ANTICOAGULANTS: ICD-10-CM

## 2021-11-12 LAB — INR PPP: 2.4

## 2021-11-22 ENCOUNTER — APPOINTMENT (OUTPATIENT)
Dept: CARDIOLOGY | Facility: CLINIC | Age: 61
End: 2021-11-22
Payer: COMMERCIAL

## 2021-11-22 ENCOUNTER — NON-APPOINTMENT (OUTPATIENT)
Age: 61
End: 2021-11-22

## 2021-11-22 VITALS
WEIGHT: 275 LBS | OXYGEN SATURATION: 99 % | BODY MASS INDEX: 38.5 KG/M2 | DIASTOLIC BLOOD PRESSURE: 82 MMHG | HEART RATE: 72 BPM | SYSTOLIC BLOOD PRESSURE: 190 MMHG | HEIGHT: 71 IN

## 2021-11-22 PROCEDURE — 99215 OFFICE O/P EST HI 40 MIN: CPT

## 2021-11-22 PROCEDURE — 93000 ELECTROCARDIOGRAM COMPLETE: CPT

## 2021-11-22 NOTE — HISTORY OF PRESENT ILLNESS
[FreeTextEntry1] : Thierno Hassan presented to the office today for a followup evaluation. He was last seen in the office nearly 2 years ago.\par \par He is now 61 years old, with a history of multiple medical problems. He has a history of coronary artery disease, status post PCI as well as coronary bypass. His coronary bypass was in 2007, and he has had PCI in 2009, with a bare-metal stents placed to the saphenous vein graft to his ramus intermedius. He has a history of paroxysmal atrial fibrillation, generally maintained in sinus rhythm on warfarin and sotalol. In 2016, he had increasingly frequent episodes of atrial fibrillation, but did not stabilize. He continued to have an intermittently tachycardic ventricular response despite the addition of diltiazem, which he did not like very much. Eventually, he was admitted to the hospital, and was referred to [facility] where he had pulmonary vein isolation.\par \par Because of recurrent tachycardia, he was started on diltiazem. Over time, he developed atrial flutter. This was relatively frequent initially after his ablation, but had seemingly become less frequent. He had symptoms suggesting tachy lara syndrome, but he never went to electrophysiology. This went away on its own, and he now refuses to take diltiazem because he feels that this caused his issues overall.\par \par At the time of the last visit, he was continuing to report chest tightness with activity, reported for many years. It seems worse recently with the transition to colder weather. He has been more fatigued, which really started after he had COVID last year. He has recurring wounds on his legs, which have healed for now.  He has not checked blood pressure at home.  His glucose has been high.

## 2021-11-22 NOTE — DISCUSSION/SUMMARY
[FreeTextEntry1] : Thierno has multiple medical problems, including premature atherosclerotic heart disease and paroxysmal atrial fibrillation. More recently, he had pulmonary vein isolation.\par \par His blood pressure remains elevated.  I have explained that he must check it every day for the next 2 weeks, and email me a list.  Hopefully he will follow through.  To say the least, this has not been his strong suit over time.\par \par His lack of compliance makes managing him almost impossible.\par \par He is planned for root canal.  He may hold his warfarin for 3 days prior to the procedure.  He will resume it afterward.\par \par He had an extensive conversation about his lack of follow-up.  Specifically with respect to his INR, he needs to have this addressed much more regularly.  We will do our best.

## 2021-12-13 LAB — INR PPP: 2.2

## 2021-12-29 ENCOUNTER — APPOINTMENT (OUTPATIENT)
Dept: CARDIOLOGY | Facility: CLINIC | Age: 61
End: 2021-12-29
Payer: SELF-PAY

## 2021-12-29 LAB
INR PPP: 1.8 RATIO
POCT-PROTHROMBIN TIME: 21.7 SECS
QUALITY CONTROL: YES

## 2021-12-29 PROCEDURE — 93793 ANTICOAG MGMT PT WARFARIN: CPT

## 2021-12-29 PROCEDURE — 85610 PROTHROMBIN TIME: CPT | Mod: QW

## 2022-01-22 ENCOUNTER — TRANSCRIPTION ENCOUNTER (OUTPATIENT)
Age: 62
End: 2022-01-22

## 2022-01-24 ENCOUNTER — RX RENEWAL (OUTPATIENT)
Age: 62
End: 2022-01-24

## 2022-02-03 LAB — INR PPP: 1.9

## 2022-02-28 ENCOUNTER — NON-APPOINTMENT (OUTPATIENT)
Age: 62
End: 2022-02-28

## 2022-02-28 ENCOUNTER — APPOINTMENT (OUTPATIENT)
Dept: CARDIOLOGY | Facility: CLINIC | Age: 62
End: 2022-02-28
Payer: COMMERCIAL

## 2022-02-28 VITALS — DIASTOLIC BLOOD PRESSURE: 90 MMHG | SYSTOLIC BLOOD PRESSURE: 150 MMHG

## 2022-02-28 VITALS
DIASTOLIC BLOOD PRESSURE: 99 MMHG | WEIGHT: 268 LBS | SYSTOLIC BLOOD PRESSURE: 177 MMHG | BODY MASS INDEX: 37.52 KG/M2 | OXYGEN SATURATION: 98 % | HEIGHT: 71 IN | HEART RATE: 65 BPM

## 2022-02-28 PROCEDURE — 99214 OFFICE O/P EST MOD 30 MIN: CPT

## 2022-02-28 PROCEDURE — 93000 ELECTROCARDIOGRAM COMPLETE: CPT

## 2022-02-28 NOTE — DISCUSSION/SUMMARY
[FreeTextEntry1] : Thierno has multiple medical problems, including premature atherosclerotic heart disease and paroxysmal atrial fibrillation. More recently, he had pulmonary vein isolation.\par \par His blood pressure remains elevated.  His blood pressure has been elevated at home as well as here in the office.  He will increase amlodipine up to 10 mg daily.\par \par His lack of compliance makes managing him almost impossible.\par \par We are planning on several teeth to be extracted.  He will hold warfarin for 5 days, noting that prior to his last tooth extraction, he held warfarin for 5 days, and bled significantly anyway.

## 2022-02-28 NOTE — HISTORY OF PRESENT ILLNESS
[FreeTextEntry1] : Thierno Hassan presented to the office today for a followup evaluation. He was last seen in the office 3 months ago.\par \par He is now 61 years old, with a history of multiple medical problems. He has a history of coronary artery disease, status post PCI as well as coronary bypass. His coronary bypass was in 2007, and he has had PCI in 2009, with a bare-metal stents placed to the saphenous vein graft to his ramus intermedius. He has a history of paroxysmal atrial fibrillation, generally maintained in sinus rhythm on warfarin and sotalol. In 2016, he had increasingly frequent episodes of atrial fibrillation, but did not stabilize. He continued to have an intermittently tachycardic ventricular response despite the addition of diltiazem, which he did not like very much. Eventually, he was admitted to the hospital, and was referred to [facility] where he had pulmonary vein isolation.\par \par Because of recurrent tachycardia, he was started on diltiazem. Over time, he developed atrial flutter. This was relatively frequent initially after his ablation, but had seemingly become less frequent. He had symptoms suggesting tachy lara syndrome, but he never went to electrophysiology. This went away on its own, and he now refuses to take diltiazem because he feels that this caused his issues overall.\par \par At the time of the last visit, he was continuing to report chest tightness with activity, reported for many years. It seems better overall, and when he shoveled recently it bothered him briefly at the beginning, and not at all after that. He has been more fatigued, which really started after he had COVID last year, and got worse after a more recent bout of COVID again. He has checked blood pressure at home and it seems that on average he has been in the 140s.. He has had some palpitations when he gets stressed.  He is planned for several teeth to be extracted.

## 2022-04-26 ENCOUNTER — NON-APPOINTMENT (OUTPATIENT)
Age: 62
End: 2022-04-26

## 2022-05-04 LAB — INR PPP: 1.8

## 2022-05-17 ENCOUNTER — NON-APPOINTMENT (OUTPATIENT)
Age: 62
End: 2022-05-17

## 2022-06-01 ENCOUNTER — NON-APPOINTMENT (OUTPATIENT)
Age: 62
End: 2022-06-01

## 2022-06-02 LAB — INR PPP: 1.7

## 2022-06-30 ENCOUNTER — APPOINTMENT (OUTPATIENT)
Dept: CARDIOLOGY | Facility: CLINIC | Age: 62
End: 2022-06-30

## 2022-07-03 LAB — INR PPP: 2

## 2022-07-26 ENCOUNTER — NON-APPOINTMENT (OUTPATIENT)
Age: 62
End: 2022-07-26

## 2022-07-27 LAB — INR PPP: 2.1

## 2022-09-01 LAB — INR PPP: 2.1

## 2022-09-08 ENCOUNTER — RX RENEWAL (OUTPATIENT)
Age: 62
End: 2022-09-08

## 2022-11-16 ENCOUNTER — NON-APPOINTMENT (OUTPATIENT)
Age: 62
End: 2022-11-16

## 2022-11-22 LAB — INR PPP: 2.6

## 2022-12-01 ENCOUNTER — RX RENEWAL (OUTPATIENT)
Age: 62
End: 2022-12-01

## 2023-01-04 ENCOUNTER — APPOINTMENT (OUTPATIENT)
Dept: CARDIOLOGY | Facility: CLINIC | Age: 63
End: 2023-01-04
Payer: COMMERCIAL

## 2023-01-04 NOTE — PHYSICAL EXAM
[General Appearance - Well Developed] : well developed [Normal Appearance] : normal appearance [Well Groomed] : well groomed [General Appearance - Well Nourished] : well nourished [No Deformities] : no deformities [General Appearance - In No Acute Distress] : no acute distress [Normal Conjunctiva] : the conjunctiva exhibited no abnormalities [Eyelids - No Xanthelasma] : the eyelids demonstrated no xanthelasmas [Normal Oral Mucosa] : normal oral mucosa [No Oral Pallor] : no oral pallor [No Oral Cyanosis] : no oral cyanosis [Normal Jugular Venous A Waves Present] : normal jugular venous A waves present [Normal Jugular Venous V Waves Present] : normal jugular venous V waves present [No Jugular Venous Rose A Waves] : no jugular venous rose A waves [Respiration, Rhythm And Depth] : normal respiratory rhythm and effort [Exaggerated Use Of Accessory Muscles For Inspiration] : no accessory muscle use [Auscultation Breath Sounds / Voice Sounds] : lungs were clear to auscultation bilaterally [Abdomen Soft] : soft [Abdomen Tenderness] : non-tender [Abdomen Mass (___ Cm)] : no abdominal mass palpated [FreeTextEntry1] : Obese [Gait - Sufficient For Exercise Testing] : the gait was sufficient for exercise testing [Abnormal Walk] : normal gait [Nail Clubbing] : no clubbing of the fingernails [Cyanosis, Localized] : no localized cyanosis [Petechial Hemorrhages (___cm)] : no petechial hemorrhages [Skin Color & Pigmentation] : normal skin color and pigmentation [] : no rash [No Venous Stasis] : no venous stasis [No Skin Ulcers] : no skin ulcer [Skin Lesions] : no skin lesions [No Xanthoma] : no  xanthoma was observed [Oriented To Time, Place, And Person] : oriented to person, place, and time [Affect] : the affect was normal [Mood] : the mood was normal [No Anxiety] : not feeling anxious [Normal] : normal [Normal Rate] : normal [Rhythm Regular] : regular [Normal S1] : normal S1 [Normal S2] : normal S2 [No Gallop] : no gallop heard [S3] : no S3 [S4] : no S4 [No Murmur] : no murmurs heard [2+] : left 2+ [Right Carotid Bruit] : no bruit heard over the right carotid [Left Carotid Bruit] : no bruit heard over the left carotid [Right Femoral Bruit] : no bruit heard over the right femoral artery [Left Femoral Bruit] : no bruit heard over the left femoral artery [Bruit] : no bruit heard [___ +] : bilateral [unfilled]U+ pretibial pitting edema

## 2023-01-04 NOTE — DISCUSSION/SUMMARY
[FreeTextEntry1] : Thierno has multiple medical problems, including premature atherosclerotic heart disease and paroxysmal atrial fibrillation. More recently, he had pulmonary vein isolation.\par \par His lack of compliance makes managing him almost impossible.\par \par I reviewed his most recent echocardiogram from January 2020.  This revealed an ejection fraction of approximately 60%, with age-appropriate valvular calcification.  I reviewed his INRs.

## 2023-01-04 NOTE — HISTORY OF PRESENT ILLNESS
[FreeTextEntry1] : Thierno Hassan presented to the office today for a followup evaluation. He was last seen in the office in February 2022.\par \par He is now 62 years old, with a history of multiple medical problems. He has a history of coronary artery disease, status post PCI as well as coronary bypass. His coronary bypass was in 2007, and he has had PCI in 2009, with a bare-metal stents placed to the saphenous vein graft to his ramus intermedius. He has a history of paroxysmal atrial fibrillation, generally maintained in sinus rhythm on warfarin and sotalol. In 2016, he had increasingly frequent episodes of atrial fibrillation, but did not stabilize. He continued to have an intermittently tachycardic ventricular response despite the addition of diltiazem, which he did not like very much. Eventually, he was admitted to the hospital, and was referred to [facility] where he had pulmonary vein isolation.\par \par Because of recurrent tachycardia, he was started on diltiazem. Over time, he developed atrial flutter. This was relatively frequent initially after his ablation, but had seemingly become less frequent. He had symptoms suggesting tachy lara syndrome, but he never went to electrophysiology. This went away on its own, and he now refuses to take diltiazem because he feels that this caused his issues overall.\par \par At the time of the last visit, he was continuing to report chest tightness with activity, reported for many years. It seems better overall, and when he shoveled recently it bothered him briefly at the beginning, and not at all after that. He has been more fatigued, which really started after he had COVID last year, and got worse after a more recent bout of COVID again. He has checked blood pressure at home and it seems that on average he has been in the 140s.. He has had some palpitations when he gets stressed.  He is planned for several teeth to be extracted.

## 2023-02-23 ENCOUNTER — OFFICE (OUTPATIENT)
Dept: URBAN - METROPOLITAN AREA CLINIC 109 | Facility: CLINIC | Age: 63
Setting detail: OPHTHALMOLOGY
End: 2023-02-23
Payer: COMMERCIAL

## 2023-02-23 DIAGNOSIS — H02.835: ICD-10-CM

## 2023-02-23 DIAGNOSIS — H02.831: ICD-10-CM

## 2023-02-23 DIAGNOSIS — E11.9: ICD-10-CM

## 2023-02-23 DIAGNOSIS — H43.393: ICD-10-CM

## 2023-02-23 DIAGNOSIS — H02.832: ICD-10-CM

## 2023-02-23 DIAGNOSIS — E11.3293: ICD-10-CM

## 2023-02-23 DIAGNOSIS — H26.491: ICD-10-CM

## 2023-02-23 DIAGNOSIS — H04.123: ICD-10-CM

## 2023-02-23 DIAGNOSIS — H02.834: ICD-10-CM

## 2023-02-23 PROCEDURE — 99213 OFFICE O/P EST LOW 20 MIN: CPT | Performed by: OPHTHALMOLOGY

## 2023-02-23 PROCEDURE — 92134 CPTRZ OPH DX IMG PST SGM RTA: CPT | Performed by: OPHTHALMOLOGY

## 2023-02-23 ASSESSMENT — REFRACTION_AUTOREFRACTION
OD_AXIS: 97
OS_AXIS: 74
OD_CYLINDER: -0.50
OS_SPHERE: +1.25
OS_CYLINDER: -1.00
OD_SPHERE: +1.25

## 2023-02-23 ASSESSMENT — REFRACTION_MANIFEST
OS_ADD: +2.75
OD_AXIS: 165
OD_VA1: 20/20-
OD_ADD: +2.75
OS_CYLINDER: -1.50
OS_SPHERE: +1.50
OS_AXIS: 75
OS_VA1: 20/20-
OD_CYLINDER: -0.50
OD_SPHERE: +1.00

## 2023-02-23 ASSESSMENT — SUPERFICIAL PUNCTATE KERATITIS (SPK)
OD_SPK: T
OS_SPK: T

## 2023-02-23 ASSESSMENT — SPHEQUIV_DERIVED
OD_SPHEQUIV: 0.75
OD_SPHEQUIV: 1
OS_SPHEQUIV: 0.75
OS_SPHEQUIV: 0.75

## 2023-02-23 ASSESSMENT — VISUAL ACUITY
OD_BCVA: 20/40-1
OS_BCVA: 20/40+2

## 2023-02-23 ASSESSMENT — CONFRONTATIONAL VISUAL FIELD TEST (CVF)
OD_FINDINGS: FULL
OS_FINDINGS: FULL

## 2023-03-03 ENCOUNTER — RX RENEWAL (OUTPATIENT)
Age: 63
End: 2023-03-03

## 2023-03-27 LAB — INR PPP: 1.7

## 2023-03-29 ENCOUNTER — APPOINTMENT (OUTPATIENT)
Dept: CARDIOLOGY | Facility: CLINIC | Age: 63
End: 2023-03-29
Payer: COMMERCIAL

## 2023-03-29 ENCOUNTER — NON-APPOINTMENT (OUTPATIENT)
Age: 63
End: 2023-03-29

## 2023-03-29 VITALS — DIASTOLIC BLOOD PRESSURE: 98 MMHG | SYSTOLIC BLOOD PRESSURE: 160 MMHG

## 2023-03-29 VITALS
WEIGHT: 284 LBS | HEIGHT: 70 IN | BODY MASS INDEX: 40.66 KG/M2 | DIASTOLIC BLOOD PRESSURE: 100 MMHG | SYSTOLIC BLOOD PRESSURE: 209 MMHG | HEART RATE: 57 BPM | OXYGEN SATURATION: 97 %

## 2023-03-29 DIAGNOSIS — R53.83 OTHER FATIGUE: ICD-10-CM

## 2023-03-29 DIAGNOSIS — R29.898 OTHER SYMPTOMS AND SIGNS INVOLVING THE MUSCULOSKELETAL SYSTEM: ICD-10-CM

## 2023-03-29 DIAGNOSIS — R06.02 SHORTNESS OF BREATH: ICD-10-CM

## 2023-03-29 PROCEDURE — 93000 ELECTROCARDIOGRAM COMPLETE: CPT

## 2023-03-29 PROCEDURE — 99214 OFFICE O/P EST MOD 30 MIN: CPT | Mod: 25

## 2023-03-29 RX ORDER — NITROGLYCERIN 0.4 MG/1
0.4 TABLET SUBLINGUAL
Qty: 30 | Refills: 0 | Status: ACTIVE | COMMUNITY
Start: 2023-03-29 | End: 1900-01-01

## 2023-03-29 NOTE — REASON FOR VISIT
[Follow-Up - Clinic] : a clinic follow-up of [Atrial Fibrillation] : atrial fibrillation [Dyspnea] : dyspnea [Hyperlipidemia] : hyperlipidemia [Hypertension] : hypertension [Coronary Artery Disease] : coronary artery disease [Symptom and Test Evaluation] : symptom and test evaluation

## 2023-03-31 NOTE — HISTORY OF PRESENT ILLNESS
[FreeTextEntry1] : Thierno Hassan presented to the office today for a followup evaluation. He was last seen in the office in Jan 2023.\par \par He is now 63 years old, with a history of multiple medical problems. He has a history of coronary artery disease, status post PCI as well as coronary bypass. His coronary bypass was in 2007, and he has had PCI in 2009, with a bare-metal stents placed to the saphenous vein graft to his ramus intermedius. He has a history of paroxysmal atrial fibrillation, generally maintained in sinus rhythm on warfarin and sotalol. In 2016, he had increasingly frequent episodes of atrial fibrillation, but did not stabilize. He continued to have an intermittently tachycardic ventricular response despite the addition of diltiazem, which he did not like very much. Eventually, he was admitted to the hospital, and was referred to [facility] where he had pulmonary vein isolation.\par \par Because of recurrent tachycardia, he was started on diltiazem. Over time, he developed atrial flutter. This was relatively frequent initially after his ablation, but had seemingly become less frequent. He had symptoms suggesting tachy lara syndrome, but he never went to electrophysiology. This went away on its own, and he now refuses to take diltiazem because he feels that this caused his issues overall.\par \par At the time of the last visit, he was continuing to report chest tightness with activity, reported for many years. It seems better overall, and when he shoveled recently it bothered him briefly at the beginning, and not at all after that. He has been more fatigued, which really started after he had COVID last year, and got worse after a more recent bout of COVID again. He has checked blood pressure at home and it seems that on average he has been in the 140s.. He has had some palpitations when he gets stressed.  He is planned for several teeth to be extracted.\par \par \par He presents back today for symptom management.  He is recovering from recent upper respiratory infection.  Earlier this month he visited urgent care with URI symptoms.  He was given oral antibiotics.\par He also wanted to know that recently he went on trip to [place].  Took plane out of [facility], he was not able to walk to the gate due to easy fatigue of the legs and shortness of breath.  He attributes this to his low activity level but has concerned given his history of cardiac disease..\par He is very sedentary but the fatigue of the legs are getting worse.\par \par He recently was advised to increase amlodipine to 10 mg due to uncontrolled blood pressure readings.  He however stated that he developed palpitations on higher dose of 10 mg.  He has discontinued taking amlodipine since then.\par He has not been able to check his blood pressure at home because his blood pressure machine is broken, but he reports not being surprised that his blood pressure is elevated.\par He also reports not taking aspirin for quite some time now.  He also often adjusts the dose of sotalol between a half and a full tab twice a day.\par His most recent INR is 1.7, he is due for repeat next month.

## 2023-03-31 NOTE — CARDIOLOGY SUMMARY
[No Ischemia] : no Ischemia [LVEF ___%] : LVEF [unfilled]% [None] : no pulmonary hypertension [Enlarged] : enlarged LA size [Mild] : mild mitral regurgitation [___] : [unfilled] [de-identified] : SR/SB 58 bpm\par RBBB\par LAD

## 2023-03-31 NOTE — DISCUSSION/SUMMARY
[EKG obtained to assist in diagnosis and management of assessed problem(s)] : EKG obtained to assist in diagnosis and management of assessed problem(s) [FreeTextEntry1] : Thierno has multiple medical problems, including premature atherosclerotic heart disease and paroxysmal atrial fibrillation he is s/p pulmonary vein isolation.\par \par HE arrives for followup and symptom management. \par He is in no acute distress.  He is euvolemic on exam.  He is not currently experiencing symptoms of angina, heart failure or unstable arrhythmia.\par ECG illustrates sinus bradycardia,RBBB/LAD unchanged from previous tracings.  His blood pressure is uncontrolled.\par \par Mr Hassan is experiencing symptoms of persistent fatigue, shortness of breath, anginal equivalent symptoms.\par I have advised that he proceed with a pharmacologic stress test to evaluate for new obstructive CAD.  He will undergo an echocardiogram to assess for changes in his cardiac structure and/or function.\par He reports a willingness to proceed with necessary stress testing and is willing to be more adherent to medical management.  He understands the importance for best cardiovascular outcomes.\par \par He is willing to try amlodipine 10 mg daily again.  He will call me if he develops symptoms of palpitations again.  He will continue losartan 100 mg daily.\par I have given him a prescription for nitroglycerin as needed as per his request, he understands that he needs to alert me if he takes it.\par \par He is maintaining sinus rhythm.  He will continue sotalol half a tab twice a day.  He is on Coumadin for oral thromboembolic prevention.  He prefers to remain on Coumadin as opposed to other novel oral anticoagulants at this time.\par \par Given his history of CAD I have advised that he resume aspirin 81 mg.\par Most recent LDL was at goal of 105\par \par I will call him with results of the above testing once completed.\par We again discussed at length the importance of lifestyle modifications including diet, exercise, weight management and diabetes management and glucose control.\par \par I reviewed his most recent echocardiogram from January 2020.  This revealed an ejection fraction of approximately 60%, with age-appropriate valvular calcification.  I reviewed his INRs.

## 2023-03-31 NOTE — REVIEW OF SYSTEMS
[Negative] : Heme/Lymph [Feeling Fatigued] : feeling fatigued [SOB] : shortness of breath [FreeTextEntry9] : leg weakness

## 2023-03-31 NOTE — PHYSICAL EXAM
[General Appearance - Well Developed] : well developed [Normal Appearance] : normal appearance [Well Groomed] : well groomed [General Appearance - Well Nourished] : well nourished [No Deformities] : no deformities [General Appearance - In No Acute Distress] : no acute distress [Eyelids - No Xanthelasma] : the eyelids demonstrated no xanthelasmas [Normal Oral Mucosa] : normal oral mucosa [No Oral Pallor] : no oral pallor [No Oral Cyanosis] : no oral cyanosis [Normal Jugular Venous A Waves Present] : normal jugular venous A waves present [Normal Jugular Venous V Waves Present] : normal jugular venous V waves present [No Jugular Venous Rose A Waves] : no jugular venous rose A waves [Respiration, Rhythm And Depth] : normal respiratory rhythm and effort [Exaggerated Use Of Accessory Muscles For Inspiration] : no accessory muscle use [Auscultation Breath Sounds / Voice Sounds] : lungs were clear to auscultation bilaterally [Abdomen Soft] : soft [Abdomen Tenderness] : non-tender [Abdomen Mass (___ Cm)] : no abdominal mass palpated [Abnormal Walk] : normal gait [Gait - Sufficient For Exercise Testing] : the gait was sufficient for exercise testing [Nail Clubbing] : no clubbing of the fingernails [Cyanosis, Localized] : no localized cyanosis [Petechial Hemorrhages (___cm)] : no petechial hemorrhages [Skin Color & Pigmentation] : normal skin color and pigmentation [] : no rash [No Venous Stasis] : no venous stasis [Skin Lesions] : no skin lesions [No Skin Ulcers] : no skin ulcer [No Xanthoma] : no  xanthoma was observed [Oriented To Time, Place, And Person] : oriented to person, place, and time [Affect] : the affect was normal [Mood] : the mood was normal [No Anxiety] : not feeling anxious [Normal] : normal [2+] : left 2+ [___ +] : bilateral [unfilled]U+ pretibial pitting edema [Well Developed] : well developed [Well Nourished] : well nourished [No Acute Distress] : no acute distress [Normal Conjunctiva] : normal conjunctiva [Normal Venous Pressure] : normal venous pressure [No Carotid Bruit] : no carotid bruit [Normal S1, S2] : normal S1, S2 [No Rub] : no rub [No Gallop] : no gallop [Normal Rate] : normal [Rhythm Regular] : regular [Normal S1] : normal S1 [Normal S2] : normal S2 [No Murmur] : no murmurs heard [No Pitting Edema] : no pitting edema present [No Abnormalities] : the abdominal aorta was not enlarged and no bruit was heard [Clear Lung Fields] : clear lung fields [Good Air Entry] : good air entry [No Respiratory Distress] : no respiratory distress  [Soft] : abdomen soft [Non Tender] : non-tender [No Masses/organomegaly] : no masses/organomegaly [Normal Bowel Sounds] : normal bowel sounds [Normal Gait] : normal gait [No Edema] : no edema [No Cyanosis] : no cyanosis [No Clubbing] : no clubbing [No Varicosities] : no varicosities [No Rash] : no rash [No Skin Lesions] : no skin lesions [Moves all extremities] : moves all extremities [No Focal Deficits] : no focal deficits [Alert and Oriented] : alert and oriented [Normal Speech] : normal speech [Normal memory] : normal memory [FreeTextEntry1] : Obese [S3] : no S3 [S4] : no S4 [Right Carotid Bruit] : no bruit heard over the right carotid [Left Carotid Bruit] : no bruit heard over the left carotid [Right Femoral Bruit] : no bruit heard over the right femoral artery [Left Femoral Bruit] : no bruit heard over the left femoral artery [Bruit] : no bruit heard

## 2023-04-04 ENCOUNTER — APPOINTMENT (OUTPATIENT)
Dept: CARDIOLOGY | Facility: CLINIC | Age: 63
End: 2023-04-04
Payer: COMMERCIAL

## 2023-04-04 PROCEDURE — 93306 TTE W/DOPPLER COMPLETE: CPT

## 2023-04-04 RX ADMIN — PERFLUTREN MG/ML: 6.52 INJECTION, SUSPENSION INTRAVENOUS at 00:00

## 2023-04-05 ENCOUNTER — NON-APPOINTMENT (OUTPATIENT)
Age: 63
End: 2023-04-05

## 2023-04-05 RX ORDER — PERFLUTREN 6.52 MG/ML
6.52 INJECTION, SUSPENSION INTRAVENOUS
Qty: 1 | Refills: 0 | Status: COMPLETED | OUTPATIENT
Start: 2023-04-04

## 2023-04-07 ENCOUNTER — APPOINTMENT (OUTPATIENT)
Dept: CARDIOLOGY | Facility: CLINIC | Age: 63
End: 2023-04-07
Payer: COMMERCIAL

## 2023-04-07 PROCEDURE — 93015 CV STRESS TEST SUPVJ I&R: CPT

## 2023-04-07 PROCEDURE — A9500: CPT

## 2023-04-07 PROCEDURE — 78452 HT MUSCLE IMAGE SPECT MULT: CPT

## 2023-04-17 ENCOUNTER — NON-APPOINTMENT (OUTPATIENT)
Age: 63
End: 2023-04-17

## 2023-04-24 ENCOUNTER — NON-APPOINTMENT (OUTPATIENT)
Age: 63
End: 2023-04-24

## 2023-04-24 ENCOUNTER — APPOINTMENT (OUTPATIENT)
Dept: CARDIOLOGY | Facility: CLINIC | Age: 63
End: 2023-04-24
Payer: COMMERCIAL

## 2023-04-24 VITALS
OXYGEN SATURATION: 98 % | BODY MASS INDEX: 40.8 KG/M2 | HEIGHT: 70 IN | SYSTOLIC BLOOD PRESSURE: 189 MMHG | WEIGHT: 285 LBS | HEART RATE: 65 BPM | DIASTOLIC BLOOD PRESSURE: 105 MMHG

## 2023-04-24 PROCEDURE — 99215 OFFICE O/P EST HI 40 MIN: CPT | Mod: 25

## 2023-04-24 PROCEDURE — 93000 ELECTROCARDIOGRAM COMPLETE: CPT

## 2023-04-24 NOTE — PHYSICAL EXAM
[Well Developed] : well developed [Well Nourished] : well nourished [No Acute Distress] : no acute distress [Normal Venous Pressure] : normal venous pressure [No Carotid Bruit] : no carotid bruit [Normal S1, S2] : normal S1, S2 [No Rub] : no rub [No Pitting Edema] : no pitting edema present [No Abnormalities] : the abdominal aorta was not enlarged and no bruit was heard [Clear Lung Fields] : clear lung fields [Good Air Entry] : good air entry [No Respiratory Distress] : no respiratory distress  [Soft] : abdomen soft [Non Tender] : non-tender [No Masses/organomegaly] : no masses/organomegaly [Normal Bowel Sounds] : normal bowel sounds [Normal Gait] : normal gait [No Edema] : no edema [No Cyanosis] : no cyanosis [No Varicosities] : no varicosities [No Clubbing] : no clubbing [No Rash] : no rash [No Skin Lesions] : no skin lesions [Moves all extremities] : moves all extremities [No Focal Deficits] : no focal deficits [Normal Speech] : normal speech [Alert and Oriented] : alert and oriented [Normal memory] : normal memory [General Appearance - Well Developed] : well developed [Normal Appearance] : normal appearance [Well Groomed] : well groomed [General Appearance - Well Nourished] : well nourished [No Deformities] : no deformities [General Appearance - In No Acute Distress] : no acute distress [Normal Conjunctiva] : the conjunctiva exhibited no abnormalities [Eyelids - No Xanthelasma] : the eyelids demonstrated no xanthelasmas [Normal Oral Mucosa] : normal oral mucosa [No Oral Pallor] : no oral pallor [No Oral Cyanosis] : no oral cyanosis [Normal Jugular Venous A Waves Present] : normal jugular venous A waves present [No Jugular Venous Rose A Waves] : no jugular venous rose A waves [Normal Jugular Venous V Waves Present] : normal jugular venous V waves present [Respiration, Rhythm And Depth] : normal respiratory rhythm and effort [Auscultation Breath Sounds / Voice Sounds] : lungs were clear to auscultation bilaterally [Exaggerated Use Of Accessory Muscles For Inspiration] : no accessory muscle use [Abdomen Soft] : soft [Abdomen Tenderness] : non-tender [Abdomen Mass (___ Cm)] : no abdominal mass palpated [Gait - Sufficient For Exercise Testing] : the gait was sufficient for exercise testing [Abnormal Walk] : normal gait [Nail Clubbing] : no clubbing of the fingernails [Cyanosis, Localized] : no localized cyanosis [Petechial Hemorrhages (___cm)] : no petechial hemorrhages [Skin Color & Pigmentation] : normal skin color and pigmentation [] : no rash [No Venous Stasis] : no venous stasis [Skin Lesions] : no skin lesions [No Skin Ulcers] : no skin ulcer [No Xanthoma] : no  xanthoma was observed [Oriented To Time, Place, And Person] : oriented to person, place, and time [Affect] : the affect was normal [Mood] : the mood was normal [No Anxiety] : not feeling anxious [Normal] : normal [Normal Rate] : normal [Rhythm Regular] : regular [Normal S1] : normal S1 [Normal S2] : normal S2 [No Gallop] : no gallop heard [No Murmur] : no murmurs heard [2+] : left 2+ [___ +] : bilateral [unfilled]U+ pretibial pitting edema [FreeTextEntry1] : Obese [S3] : no S3 [S4] : no S4 [Right Carotid Bruit] : no bruit heard over the right carotid [Left Carotid Bruit] : no bruit heard over the left carotid [Right Femoral Bruit] : no bruit heard over the right femoral artery [Left Femoral Bruit] : no bruit heard over the left femoral artery [Bruit] : no bruit heard

## 2023-04-24 NOTE — REASON FOR VISIT
[Symptom and Test Evaluation] : symptom and test evaluation [Hyperlipidemia] : hyperlipidemia [Follow-Up - Clinic] : a clinic follow-up of [Atrial Fibrillation] : atrial fibrillation [Coronary Artery Disease] : coronary artery disease [Dyspnea] : dyspnea [Hypertension] : hypertension

## 2023-04-24 NOTE — REVIEW OF SYSTEMS
[Feeling Fatigued] : feeling fatigued [SOB] : shortness of breath [Negative] : Heme/Lymph [FreeTextEntry9] : leg weakness

## 2023-04-24 NOTE — CARDIOLOGY SUMMARY
[No Ischemia] : no Ischemia [LVEF ___%] : LVEF [unfilled]% [None] : no pulmonary hypertension [Enlarged] : enlarged LA size [Mild] : mild mitral regurgitation [___] : [unfilled] [de-identified] : April 24, 2023 sinus bradycardia, right bundle branch block, left axis deviation

## 2023-04-24 NOTE — HISTORY OF PRESENT ILLNESS
[FreeTextEntry1] : Thierno Hassan presented to the office today for a followup evaluation. He was last seen in the office 1 month ago.\par \par He is now 63 years old, with a history of multiple medical problems. He has a history of coronary artery disease, status post PCI as well as coronary bypass. His coronary bypass was in 2007, and he has had PCI in 2009, with a bare-metal stents placed to the saphenous vein graft to his ramus intermedius. He has a history of paroxysmal atrial fibrillation, generally maintained in sinus rhythm on warfarin and sotalol. In 2016, he had increasingly frequent episodes of atrial fibrillation, but did not stabilize. He continued to have an intermittently tachycardic ventricular response despite the addition of diltiazem, which he did not like very much. Eventually, he was admitted to the hospital, and was referred to [facility] where he had pulmonary vein isolation.\par \par Because of recurrent tachycardia, he was started on diltiazem. Over time, he developed atrial flutter. This was relatively frequent initially after his ablation, but had seemingly become less frequent. He had symptoms suggesting tachy lara syndrome, but he never went to electrophysiology. This went away on its own, and he now refuses to take diltiazem because he feels that this caused his issues overall.\par \par He was evaluated March 29, 2023 reporting symptoms of significant fatigue with activity and uncontrolled blood pressure.  His symptoms were suspicious for angina, which had been a suspicion we had had for some time, but had not addressed due to his fears and poor follow-up.  His blood pressure was elevated at least in part due to his discontinuing amlodipine on his own.  I resumed amlodipine, given nitroglycerin to use as needed, and I recommended several examinations, including an echocardiogram and nuclear stress testing.\par \par Echocardiography was performed April 7, 2023.  This revealed a normal ejection fraction of 54%, with mild to moderate mitral regurgitation.  Pharmacologic nuclear stress testing revealed a medium sized mild to moderate defect in the basal anterior and anteroseptal walls, as well as a small sized mild to moderate defect in the mid inferolateral wall.  The stress ejection fraction was 51%.  Given his symptoms and these findings, I recommended that we discuss things in the office. \par \par He has not been checking his blood pressure regularly.  His wife, who took some kind of course about taking blood pressure in the past, has been checking his blood pressure infrequently, and it has been persistently elevated.  He reports that he continues to experience fatigability with exertion, and that this is somewhat progressive.  His edema recently got a little bit worse, and he had some blisters formed, and rupture.

## 2023-04-24 NOTE — DISCUSSION/SUMMARY
[FreeTextEntry1] : Thierno has multiple medical problems, including premature atherosclerotic heart disease and paroxysmal atrial fibrillation he is s/p pulmonary vein isolation.\par \par Mr Hassan is experiencing symptoms of persistent fatigue, shortness of breath, which seem to represent anginal equivalent symptoms.  I reviewed with him his echo and his pharmacologic nuclear stress test from April 2023. \par \par After much discussion, he is in agreement that we should pursue cardiac catheterization.\par \par His blood pressure remains extremely elevated.  Some of this is related to anxiety, but much is uncontrolled essential hypertension.  He will start hydralazine 25 mg twice daily, which hopefully will have some beneficial effects.  We might be able to uptitrate that in time.  We will reconvene after his catheterization.

## 2023-04-28 DIAGNOSIS — Z91.041 RADIOGRAPHIC DYE ALLERGY STATUS: ICD-10-CM

## 2023-04-28 RX ORDER — PREDNISONE 50 MG/1
50 TABLET ORAL
Qty: 3 | Refills: 0 | Status: ACTIVE | COMMUNITY
Start: 2023-04-28 | End: 1900-01-01

## 2023-05-01 ENCOUNTER — INPATIENT (INPATIENT)
Facility: HOSPITAL | Age: 63
LOS: 2 days | Discharge: HOME | DRG: 246 | End: 2023-05-04
Attending: INTERNAL MEDICINE | Admitting: INTERNAL MEDICINE
Payer: COMMERCIAL

## 2023-05-01 VITALS
HEART RATE: 72 BPM | HEIGHT: 66 IN | DIASTOLIC BLOOD PRESSURE: 67 MMHG | SYSTOLIC BLOOD PRESSURE: 149 MMHG | WEIGHT: 285.06 LBS | TEMPERATURE: 99 F | RESPIRATION RATE: 18 BRPM | OXYGEN SATURATION: 93 %

## 2023-05-01 DIAGNOSIS — Z95.5 PRESENCE OF CORONARY ANGIOPLASTY IMPLANT AND GRAFT: Chronic | ICD-10-CM

## 2023-05-01 DIAGNOSIS — I20.0 UNSTABLE ANGINA: ICD-10-CM

## 2023-05-01 DIAGNOSIS — Z95.1 PRESENCE OF AORTOCORONARY BYPASS GRAFT: Chronic | ICD-10-CM

## 2023-05-01 LAB
ANION GAP SERPL CALC-SCNC: 12 MMOL/L — SIGNIFICANT CHANGE UP (ref 5–17)
APTT BLD: 37.8 SEC — HIGH (ref 27.5–35.5)
BASOPHILS # BLD AUTO: 0.01 K/UL — SIGNIFICANT CHANGE UP (ref 0–0.2)
BASOPHILS NFR BLD AUTO: 0.1 % — SIGNIFICANT CHANGE UP (ref 0–2)
BUN SERPL-MCNC: 29 MG/DL — HIGH (ref 7–23)
CALCIUM SERPL-MCNC: 9 MG/DL — SIGNIFICANT CHANGE UP (ref 8.4–10.5)
CHLORIDE SERPL-SCNC: 104 MMOL/L — SIGNIFICANT CHANGE UP (ref 96–108)
CO2 SERPL-SCNC: 20 MMOL/L — LOW (ref 22–31)
CREAT SERPL-MCNC: 1.07 MG/DL — SIGNIFICANT CHANGE UP (ref 0.5–1.3)
EGFR: 79 ML/MIN/1.73M2 — SIGNIFICANT CHANGE UP
EOSINOPHIL # BLD AUTO: 0 K/UL — SIGNIFICANT CHANGE UP (ref 0–0.5)
EOSINOPHIL NFR BLD AUTO: 0 % — SIGNIFICANT CHANGE UP (ref 0–6)
GLUCOSE BLDC GLUCOMTR-MCNC: 317 MG/DL — HIGH (ref 70–99)
GLUCOSE BLDC GLUCOMTR-MCNC: 327 MG/DL — HIGH (ref 70–99)
GLUCOSE BLDC GLUCOMTR-MCNC: 359 MG/DL — HIGH (ref 70–99)
GLUCOSE BLDC GLUCOMTR-MCNC: 372 MG/DL — HIGH (ref 70–99)
GLUCOSE SERPL-MCNC: 390 MG/DL — HIGH (ref 70–99)
HCT VFR BLD CALC: 37.9 % — LOW (ref 39–50)
HGB BLD-MCNC: 12.2 G/DL — LOW (ref 13–17)
IMM GRANULOCYTES NFR BLD AUTO: 0.5 % — SIGNIFICANT CHANGE UP (ref 0–0.9)
INR BLD: 1.85 RATIO — HIGH (ref 0.88–1.16)
LYMPHOCYTES # BLD AUTO: 0.39 K/UL — LOW (ref 1–3.3)
LYMPHOCYTES # BLD AUTO: 3.5 % — LOW (ref 13–44)
MCHC RBC-ENTMCNC: 30 PG — SIGNIFICANT CHANGE UP (ref 27–34)
MCHC RBC-ENTMCNC: 32.2 GM/DL — SIGNIFICANT CHANGE UP (ref 32–36)
MCV RBC AUTO: 93.1 FL — SIGNIFICANT CHANGE UP (ref 80–100)
MONOCYTES # BLD AUTO: 0.1 K/UL — SIGNIFICANT CHANGE UP (ref 0–0.9)
MONOCYTES NFR BLD AUTO: 0.9 % — LOW (ref 2–14)
NEUTROPHILS # BLD AUTO: 10.69 K/UL — HIGH (ref 1.8–7.4)
NEUTROPHILS NFR BLD AUTO: 95 % — HIGH (ref 43–77)
NRBC # BLD: 0 /100 WBCS — SIGNIFICANT CHANGE UP (ref 0–0)
PLATELET # BLD AUTO: 129 K/UL — LOW (ref 150–400)
POTASSIUM SERPL-MCNC: 4.2 MMOL/L — SIGNIFICANT CHANGE UP (ref 3.5–5.3)
POTASSIUM SERPL-SCNC: 4.2 MMOL/L — SIGNIFICANT CHANGE UP (ref 3.5–5.3)
PROTHROM AB SERPL-ACNC: 21.6 SEC — HIGH (ref 10.5–13.4)
RBC # BLD: 4.07 M/UL — LOW (ref 4.2–5.8)
RBC # FLD: 14.7 % — HIGH (ref 10.3–14.5)
SODIUM SERPL-SCNC: 136 MMOL/L — SIGNIFICANT CHANGE UP (ref 135–145)
WBC # BLD: 11.25 K/UL — HIGH (ref 3.8–10.5)
WBC # FLD AUTO: 11.25 K/UL — HIGH (ref 3.8–10.5)

## 2023-05-01 PROCEDURE — 93459 L HRT ART/GRFT ANGIO: CPT | Mod: 26

## 2023-05-01 PROCEDURE — 92937 PRQ TRLUML REVSC CAB GRF 1: CPT | Mod: LC

## 2023-05-01 PROCEDURE — 92978 ENDOLUMINL IVUS OCT C 1ST: CPT | Mod: 26,LC

## 2023-05-01 PROCEDURE — 93010 ELECTROCARDIOGRAM REPORT: CPT

## 2023-05-01 PROCEDURE — 99152 MOD SED SAME PHYS/QHP 5/>YRS: CPT

## 2023-05-01 RX ORDER — SODIUM CHLORIDE 9 MG/ML
250 INJECTION INTRAMUSCULAR; INTRAVENOUS; SUBCUTANEOUS ONCE
Refills: 0 | Status: COMPLETED | OUTPATIENT
Start: 2023-05-01 | End: 2023-05-01

## 2023-05-01 RX ORDER — SOTALOL HCL 120 MG
0.5 TABLET ORAL
Refills: 0 | DISCHARGE

## 2023-05-01 RX ORDER — HEPARIN SODIUM 5000 [USP'U]/ML
10000 INJECTION INTRAVENOUS; SUBCUTANEOUS EVERY 6 HOURS
Refills: 0 | Status: DISCONTINUED | OUTPATIENT
Start: 2023-05-01 | End: 2023-05-03

## 2023-05-01 RX ORDER — INSULIN LISPRO 100/ML
VIAL (ML) SUBCUTANEOUS AT BEDTIME
Refills: 0 | Status: DISCONTINUED | OUTPATIENT
Start: 2023-05-01 | End: 2023-05-04

## 2023-05-01 RX ORDER — AMLODIPINE BESYLATE 2.5 MG/1
1 TABLET ORAL
Refills: 0 | DISCHARGE

## 2023-05-01 RX ORDER — INSULIN LISPRO 100/ML
VIAL (ML) SUBCUTANEOUS
Refills: 0 | Status: DISCONTINUED | OUTPATIENT
Start: 2023-05-01 | End: 2023-05-04

## 2023-05-01 RX ORDER — INSULIN LISPRO 100/ML
0 VIAL (ML) SUBCUTANEOUS
Qty: 0 | Refills: 0 | DISCHARGE

## 2023-05-01 RX ORDER — LOSARTAN POTASSIUM 100 MG/1
1 TABLET, FILM COATED ORAL
Refills: 0 | DISCHARGE

## 2023-05-01 RX ORDER — HYDRALAZINE HCL 50 MG
1 TABLET ORAL
Refills: 0 | DISCHARGE

## 2023-05-01 RX ORDER — INSULIN GLARGINE 100 [IU]/ML
9 INJECTION, SOLUTION SUBCUTANEOUS AT BEDTIME
Refills: 0 | Status: DISCONTINUED | OUTPATIENT
Start: 2023-05-01 | End: 2023-05-02

## 2023-05-01 RX ORDER — GLUCAGON INJECTION, SOLUTION 0.5 MG/.1ML
1 INJECTION, SOLUTION SUBCUTANEOUS ONCE
Refills: 0 | Status: DISCONTINUED | OUTPATIENT
Start: 2023-05-01 | End: 2023-05-04

## 2023-05-01 RX ORDER — HYDRALAZINE HCL 50 MG
25 TABLET ORAL
Refills: 0 | Status: DISCONTINUED | OUTPATIENT
Start: 2023-05-01 | End: 2023-05-04

## 2023-05-01 RX ORDER — HEPARIN SODIUM 5000 [USP'U]/ML
5000 INJECTION INTRAVENOUS; SUBCUTANEOUS EVERY 6 HOURS
Refills: 0 | Status: DISCONTINUED | OUTPATIENT
Start: 2023-05-01 | End: 2023-05-03

## 2023-05-01 RX ORDER — DEXTROSE 50 % IN WATER 50 %
25 SYRINGE (ML) INTRAVENOUS ONCE
Refills: 0 | Status: DISCONTINUED | OUTPATIENT
Start: 2023-05-01 | End: 2023-05-04

## 2023-05-01 RX ORDER — LOSARTAN POTASSIUM 100 MG/1
100 TABLET, FILM COATED ORAL DAILY
Refills: 0 | Status: DISCONTINUED | OUTPATIENT
Start: 2023-05-01 | End: 2023-05-04

## 2023-05-01 RX ORDER — INSULIN LISPRO 100/ML
16 VIAL (ML) SUBCUTANEOUS
Refills: 0 | Status: DISCONTINUED | OUTPATIENT
Start: 2023-05-01 | End: 2023-05-02

## 2023-05-01 RX ORDER — SODIUM CHLORIDE 9 MG/ML
1000 INJECTION, SOLUTION INTRAVENOUS
Refills: 0 | Status: DISCONTINUED | OUTPATIENT
Start: 2023-05-01 | End: 2023-05-04

## 2023-05-01 RX ORDER — LOSARTAN POTASSIUM 100 MG/1
0 TABLET, FILM COATED ORAL
Qty: 0 | Refills: 0 | DISCHARGE

## 2023-05-01 RX ORDER — HEPARIN SODIUM 5000 [USP'U]/ML
INJECTION INTRAVENOUS; SUBCUTANEOUS
Qty: 25000 | Refills: 0 | Status: DISCONTINUED | OUTPATIENT
Start: 2023-05-01 | End: 2023-05-03

## 2023-05-01 RX ORDER — FUROSEMIDE 40 MG
40 TABLET ORAL ONCE
Refills: 0 | Status: COMPLETED | OUTPATIENT
Start: 2023-05-01 | End: 2023-05-01

## 2023-05-01 RX ORDER — SODIUM CHLORIDE 9 MG/ML
1000 INJECTION INTRAMUSCULAR; INTRAVENOUS; SUBCUTANEOUS
Refills: 0 | Status: DISCONTINUED | OUTPATIENT
Start: 2023-05-01 | End: 2023-05-04

## 2023-05-01 RX ORDER — SOTALOL HCL 120 MG
60 TABLET ORAL EVERY 12 HOURS
Refills: 0 | Status: DISCONTINUED | OUTPATIENT
Start: 2023-05-01 | End: 2023-05-04

## 2023-05-01 RX ORDER — AMLODIPINE BESYLATE 2.5 MG/1
0 TABLET ORAL
Qty: 0 | Refills: 0 | DISCHARGE

## 2023-05-01 RX ORDER — DEXTROSE 50 % IN WATER 50 %
12.5 SYRINGE (ML) INTRAVENOUS ONCE
Refills: 0 | Status: DISCONTINUED | OUTPATIENT
Start: 2023-05-01 | End: 2023-05-04

## 2023-05-01 RX ORDER — WARFARIN SODIUM 2.5 MG/1
0 TABLET ORAL
Qty: 0 | Refills: 0 | DISCHARGE

## 2023-05-01 RX ORDER — DEXTROSE 50 % IN WATER 50 %
15 SYRINGE (ML) INTRAVENOUS ONCE
Refills: 0 | Status: DISCONTINUED | OUTPATIENT
Start: 2023-05-01 | End: 2023-05-04

## 2023-05-01 RX ORDER — DIPHENHYDRAMINE HCL 50 MG
50 CAPSULE ORAL ONCE
Refills: 0 | Status: COMPLETED | OUTPATIENT
Start: 2023-05-01 | End: 2023-05-01

## 2023-05-01 RX ORDER — AMLODIPINE BESYLATE 2.5 MG/1
10 TABLET ORAL DAILY
Refills: 0 | Status: DISCONTINUED | OUTPATIENT
Start: 2023-05-01 | End: 2023-05-04

## 2023-05-01 RX ADMIN — Medication 40 MILLIGRAM(S): at 17:44

## 2023-05-01 RX ADMIN — Medication 25 MILLIGRAM(S): at 16:12

## 2023-05-01 RX ADMIN — HEPARIN SODIUM 2300 UNIT(S)/HR: 5000 INJECTION INTRAVENOUS; SUBCUTANEOUS at 23:51

## 2023-05-01 RX ADMIN — Medication 16 UNIT(S): at 16:13

## 2023-05-01 RX ADMIN — SODIUM CHLORIDE 500 MILLILITER(S): 9 INJECTION INTRAMUSCULAR; INTRAVENOUS; SUBCUTANEOUS at 13:24

## 2023-05-01 RX ADMIN — Medication 60 MILLIGRAM(S): at 21:21

## 2023-05-01 RX ADMIN — INSULIN GLARGINE 9 UNIT(S): 100 INJECTION, SOLUTION SUBCUTANEOUS at 21:20

## 2023-05-01 RX ADMIN — Medication 4: at 16:13

## 2023-05-01 RX ADMIN — SODIUM CHLORIDE 50 MILLILITER(S): 9 INJECTION INTRAMUSCULAR; INTRAVENOUS; SUBCUTANEOUS at 13:24

## 2023-05-01 RX ADMIN — Medication 3: at 21:21

## 2023-05-01 RX ADMIN — LOSARTAN POTASSIUM 100 MILLIGRAM(S): 100 TABLET, FILM COATED ORAL at 17:44

## 2023-05-01 NOTE — H&P CARDIOLOGY - NEGATIVE NEUROLOGICAL SYMPTOMS
no weakness/no paresthesias/no focal seizures/no syncope/no tremors/no vertigo/no loss of sensation/no difficulty walking/no headache/no loss of consciousness/no confusion

## 2023-05-01 NOTE — H&P CARDIOLOGY - NEGATIVE CARDIOVASCULAR SYMPTOMS
Received and left sitting on chair in room with call bell in reach and RN present. no chest pain/no palpitations/no peripheral edema/no claudication

## 2023-05-01 NOTE — PATIENT PROFILE ADULT - FALL HARM RISK - HARM RISK INTERVENTIONS

## 2023-05-01 NOTE — H&P CARDIOLOGY - HISTORY OF PRESENT ILLNESS
Cardiologist: Dr. Migue Barlow  Pharmacy: [facility] ([address] , [place] [id number])    Shellfish allergy - pre-medicated w/ Prednione 50mg q8hrs x3 and Benadryl on-call to lab    62 y/o Male, former smoker, w/ PMHx of HTN, HLD, Type 2 Diabetes c/b neuropathy, CAD s/p CABG 5/2007/PCI SVG-RI 2/2009, paroxysmal AFib/flutter (s/p ablation w/ PVI; on Warfarin, last dose 4/27), known RBBB, Anxiety initially presented to outpatient cardiologist for routine cardiac evaluation. Patient reports fatigue w/ daily activities, shortness of breath/MASON, orthopnea/PND and uncontrolled blood pressure. Patient underwent TTE and nuclear stress test which were abnormal (results as below). Denies headaches, changes in vision, chest pain, palpitations, abdominal pain, N/V/D, leg pain/edema or any other complaints. In light of pt's risk factors, CCS class III anginal-equivalent symptoms and abnormal TTE/nuclear stress test; pt referred to [facility] for cardiac catheterization w/ possible intervention if clinically indicated. No implantable devices.     Review of studies:  CABG 5/11/2007  Cardiac catheterization 2/24/09:   mLM 40%, oLAD 70%, mLAD 80%, dLAD 50%, D1 99%, pCx 90%, mCx 95%, OM1 100% (small), OM2 99%, Laura 100% (supplied by bypass graft), mRCA 100% ISR.   Grafts: LIMA-dLAD, LIMA-D1, and SVG-RPDA patent; SVG-RI 90% s/p stent.   LV gram: mild diaphragmatic hypokinesis & mild posterobasal hypkinesis, LVEF 60%, no AS  TTE 4/4/23: LVEF 54%, mild-modMR, trace AI, mod TR, mildly dilated LA, moderately dilated LV, G3DD, borderline pHTN (PASP 38)  Pharmacologic stress test 4/7/23: medium, mild-mod defect in basal anterior and basal anteroseptal walls suggestive of infarction w/ mild venus-infarct ischemia; small, mild-mod defect in mid inferolateral wall that is partially reversible suggestive of ischemia; post-stress LVEF 51%  EKG 4/2023: Sinus bradycardia, RBBB Cardiologist: Dr. Migue Barlow  Pharmacy: [facility] ([address] , [place] [id number])    Shellfish allergy - pre-medicated w/ Prednione 50mg q8hrs x3 and Benadryl on-call to lab    64 y/o Male, former smoker, w/ PMHx of HTN, HLD, Type 2 Diabetes c/b neuropathy, CAD s/p CABG 5/2007/PCI SVG-RI 2/2009, paroxysmal AFib/flutter (s/p ablation w/ PVI; on Warfarin, last dose 4/27), known RBBB, Anxiety initially presented to outpatient cardiologist for routine cardiac evaluation. Patient reports fatigue w/ daily activities, shortness of breath/MASON, orthopnea/PND and uncontrolled blood pressure. Patient underwent TTE and nuclear stress test which were abnormal (results as below). Denies headaches, changes in vision, chest pain, palpitations, abdominal pain, N/V/D, leg pain/edema or any other complaints. In light of pt's risk factors, CCS class III anginal-equivalent symptoms and abnormal TTE/nuclear stress test; pt referred to [facility] for cardiac catheterization w/ possible intervention if clinically indicated. No implantable devices.     Review of studies:  CABG 5/11/2007  Cardiac catheterization 2/24/09:   mLM 40%, oLAD 70%, mLAD 80%, dLAD 50%, D1 99%, pCx 90%, mCx 95%, OM1 100% (small), OM2 99%, Laura 100% (supplied by bypass graft), mRCA 100% ISR.   Grafts: LIMA-dLAD, LIMA-D1, and SVG-RPDA patent; SVG-RI 90% s/p stent.   LV gram: mild diaphragmatic hypokinesis & mild posterobasal hypokinesis LVEF 60%, no AS  TTE 4/4/23: LVEF 54%, mild-modMR, trace AI, mod TR, mildly dilated LA, moderately dilated LV, G3DD, borderline pHTN (PASP 38)  Pharmacologic stress test 4/7/23: medium, mild-mod defect in basal anterior and basal anteroseptal walls suggestive of infarction w/ mild venus-infarct ischemia; small, mild-mod defect in mid inferolateral wall that is partially reversible suggestive of ischemia; post-stress LVEF 51%  EKG 4/2023: Sinus bradycardia, RBBB

## 2023-05-01 NOTE — H&P CARDIOLOGY - NSICDXPASTMEDICALHX_GEN_ALL_CORE_FT
PAST MEDICAL HISTORY:  Afib     CAD (coronary artery disease)     Diabetes mellitus     Diabetic neuropathy     High cholesterol     History of right bundle branch block (RBBB)     Hypertension     Myocardial infarct     Ulcer of left lower extremity

## 2023-05-02 ENCOUNTER — TRANSCRIPTION ENCOUNTER (OUTPATIENT)
Age: 63
End: 2023-05-02

## 2023-05-02 DIAGNOSIS — I48.91 UNSPECIFIED ATRIAL FIBRILLATION: ICD-10-CM

## 2023-05-02 DIAGNOSIS — I25.10 ATHEROSCLEROTIC HEART DISEASE OF NATIVE CORONARY ARTERY WITHOUT ANGINA PECTORIS: ICD-10-CM

## 2023-05-02 DIAGNOSIS — I10 ESSENTIAL (PRIMARY) HYPERTENSION: ICD-10-CM

## 2023-05-02 DIAGNOSIS — E11.65 TYPE 2 DIABETES MELLITUS WITH HYPERGLYCEMIA: ICD-10-CM

## 2023-05-02 DIAGNOSIS — E78.5 HYPERLIPIDEMIA, UNSPECIFIED: ICD-10-CM

## 2023-05-02 LAB
A1C WITH ESTIMATED AVERAGE GLUCOSE RESULT: 8.2 % — HIGH (ref 4–5.6)
ANION GAP SERPL CALC-SCNC: 11 MMOL/L — SIGNIFICANT CHANGE UP (ref 5–17)
ANION GAP SERPL CALC-SCNC: 12 MMOL/L — SIGNIFICANT CHANGE UP (ref 5–17)
APTT BLD: 169.8 SEC — CRITICAL HIGH (ref 27.5–35.5)
APTT BLD: 185.9 SEC — CRITICAL HIGH (ref 27.5–35.5)
APTT BLD: 96.5 SEC — HIGH (ref 27.5–35.5)
BUN SERPL-MCNC: 31 MG/DL — HIGH (ref 7–23)
BUN SERPL-MCNC: 39 MG/DL — HIGH (ref 7–23)
CALCIUM SERPL-MCNC: 8.7 MG/DL — SIGNIFICANT CHANGE UP (ref 8.4–10.5)
CALCIUM SERPL-MCNC: 9.3 MG/DL — SIGNIFICANT CHANGE UP (ref 8.4–10.5)
CHLORIDE SERPL-SCNC: 101 MMOL/L — SIGNIFICANT CHANGE UP (ref 96–108)
CHLORIDE SERPL-SCNC: 101 MMOL/L — SIGNIFICANT CHANGE UP (ref 96–108)
CO2 SERPL-SCNC: 25 MMOL/L — SIGNIFICANT CHANGE UP (ref 22–31)
CO2 SERPL-SCNC: 27 MMOL/L — SIGNIFICANT CHANGE UP (ref 22–31)
CREAT SERPL-MCNC: 1.17 MG/DL — SIGNIFICANT CHANGE UP (ref 0.5–1.3)
CREAT SERPL-MCNC: 1.46 MG/DL — HIGH (ref 0.5–1.3)
EGFR: 54 ML/MIN/1.73M2 — LOW
EGFR: 71 ML/MIN/1.73M2 — SIGNIFICANT CHANGE UP
ESTIMATED AVERAGE GLUCOSE: 189 MG/DL — HIGH (ref 68–114)
GLUCOSE BLDC GLUCOMTR-MCNC: 141 MG/DL — HIGH (ref 70–99)
GLUCOSE BLDC GLUCOMTR-MCNC: 211 MG/DL — HIGH (ref 70–99)
GLUCOSE BLDC GLUCOMTR-MCNC: 265 MG/DL — HIGH (ref 70–99)
GLUCOSE BLDC GLUCOMTR-MCNC: 301 MG/DL — HIGH (ref 70–99)
GLUCOSE SERPL-MCNC: 113 MG/DL — HIGH (ref 70–99)
GLUCOSE SERPL-MCNC: 335 MG/DL — HIGH (ref 70–99)
HCT VFR BLD CALC: 40.1 % — SIGNIFICANT CHANGE UP (ref 39–50)
HGB BLD-MCNC: 13 G/DL — SIGNIFICANT CHANGE UP (ref 13–17)
INR BLD: 1.75 RATIO — HIGH (ref 0.88–1.16)
MAGNESIUM SERPL-MCNC: 2.3 MG/DL — SIGNIFICANT CHANGE UP (ref 1.6–2.6)
MCHC RBC-ENTMCNC: 30.3 PG — SIGNIFICANT CHANGE UP (ref 27–34)
MCHC RBC-ENTMCNC: 32.4 GM/DL — SIGNIFICANT CHANGE UP (ref 32–36)
MCV RBC AUTO: 93.5 FL — SIGNIFICANT CHANGE UP (ref 80–100)
NRBC # BLD: 0 /100 WBCS — SIGNIFICANT CHANGE UP (ref 0–0)
PLATELET # BLD AUTO: 177 K/UL — SIGNIFICANT CHANGE UP (ref 150–400)
POTASSIUM SERPL-MCNC: 3.7 MMOL/L — SIGNIFICANT CHANGE UP (ref 3.5–5.3)
POTASSIUM SERPL-MCNC: 3.9 MMOL/L — SIGNIFICANT CHANGE UP (ref 3.5–5.3)
POTASSIUM SERPL-SCNC: 3.7 MMOL/L — SIGNIFICANT CHANGE UP (ref 3.5–5.3)
POTASSIUM SERPL-SCNC: 3.9 MMOL/L — SIGNIFICANT CHANGE UP (ref 3.5–5.3)
PROTHROM AB SERPL-ACNC: 20.4 SEC — HIGH (ref 10.5–13.4)
RBC # BLD: 4.29 M/UL — SIGNIFICANT CHANGE UP (ref 4.2–5.8)
RBC # FLD: 15.2 % — HIGH (ref 10.3–14.5)
SODIUM SERPL-SCNC: 137 MMOL/L — SIGNIFICANT CHANGE UP (ref 135–145)
SODIUM SERPL-SCNC: 140 MMOL/L — SIGNIFICANT CHANGE UP (ref 135–145)
WBC # BLD: 14.99 K/UL — HIGH (ref 3.8–10.5)
WBC # FLD AUTO: 14.99 K/UL — HIGH (ref 3.8–10.5)

## 2023-05-02 PROCEDURE — 99223 1ST HOSP IP/OBS HIGH 75: CPT

## 2023-05-02 PROCEDURE — 93010 ELECTROCARDIOGRAM REPORT: CPT

## 2023-05-02 RX ORDER — DULAGLUTIDE 4.5 MG/.5ML
0.75 INJECTION, SOLUTION SUBCUTANEOUS
Qty: 1 | Refills: 0
Start: 2023-05-02

## 2023-05-02 RX ORDER — FUROSEMIDE 40 MG
40 TABLET ORAL ONCE
Refills: 0 | Status: COMPLETED | OUTPATIENT
Start: 2023-05-02 | End: 2023-05-02

## 2023-05-02 RX ORDER — METOPROLOL TARTRATE 50 MG
5 TABLET ORAL ONCE
Refills: 0 | Status: COMPLETED | OUTPATIENT
Start: 2023-05-02 | End: 2023-05-02

## 2023-05-02 RX ORDER — INSULIN GLARGINE 100 [IU]/ML
15 INJECTION, SOLUTION SUBCUTANEOUS AT BEDTIME
Refills: 0 | Status: DISCONTINUED | OUTPATIENT
Start: 2023-05-02 | End: 2023-05-04

## 2023-05-02 RX ORDER — ATORVASTATIN CALCIUM 80 MG/1
80 TABLET, FILM COATED ORAL AT BEDTIME
Refills: 0 | Status: DISCONTINUED | OUTPATIENT
Start: 2023-05-02 | End: 2023-05-04

## 2023-05-02 RX ORDER — INSULIN LISPRO 100/ML
20 VIAL (ML) SUBCUTANEOUS
Refills: 0 | Status: DISCONTINUED | OUTPATIENT
Start: 2023-05-02 | End: 2023-05-04

## 2023-05-02 RX ADMIN — LOSARTAN POTASSIUM 100 MILLIGRAM(S): 100 TABLET, FILM COATED ORAL at 06:35

## 2023-05-02 RX ADMIN — Medication 25 MILLIGRAM(S): at 17:08

## 2023-05-02 RX ADMIN — Medication 60 MILLIGRAM(S): at 17:10

## 2023-05-02 RX ADMIN — Medication 16 UNIT(S): at 07:17

## 2023-05-02 RX ADMIN — Medication 4: at 07:17

## 2023-05-02 RX ADMIN — Medication 40 MILLIGRAM(S): at 06:50

## 2023-05-02 RX ADMIN — HEPARIN SODIUM 1900 UNIT(S)/HR: 5000 INJECTION INTRAVENOUS; SUBCUTANEOUS at 07:47

## 2023-05-02 RX ADMIN — Medication 40 MILLIGRAM(S): at 17:08

## 2023-05-02 RX ADMIN — Medication 60 MILLIGRAM(S): at 06:35

## 2023-05-02 RX ADMIN — HEPARIN SODIUM 1900 UNIT(S)/HR: 5000 INJECTION INTRAVENOUS; SUBCUTANEOUS at 12:06

## 2023-05-02 RX ADMIN — Medication 3: at 12:27

## 2023-05-02 RX ADMIN — HEPARIN SODIUM 0 UNIT(S)/HR: 5000 INJECTION INTRAVENOUS; SUBCUTANEOUS at 15:08

## 2023-05-02 RX ADMIN — Medication 2: at 16:42

## 2023-05-02 RX ADMIN — Medication 20 UNIT(S): at 16:43

## 2023-05-02 RX ADMIN — AMLODIPINE BESYLATE 10 MILLIGRAM(S): 2.5 TABLET ORAL at 06:35

## 2023-05-02 RX ADMIN — Medication 5 MILLIGRAM(S): at 12:52

## 2023-05-02 RX ADMIN — HEPARIN SODIUM 1500 UNIT(S)/HR: 5000 INJECTION INTRAVENOUS; SUBCUTANEOUS at 22:59

## 2023-05-02 RX ADMIN — Medication 16 UNIT(S): at 12:27

## 2023-05-02 RX ADMIN — Medication 25 MILLIGRAM(S): at 06:35

## 2023-05-02 RX ADMIN — HEPARIN SODIUM 1500 UNIT(S)/HR: 5000 INJECTION INTRAVENOUS; SUBCUTANEOUS at 16:10

## 2023-05-02 RX ADMIN — HEPARIN SODIUM 0 UNIT(S)/HR: 5000 INJECTION INTRAVENOUS; SUBCUTANEOUS at 06:42

## 2023-05-02 RX ADMIN — INSULIN GLARGINE 15 UNIT(S): 100 INJECTION, SOLUTION SUBCUTANEOUS at 21:03

## 2023-05-02 NOTE — DISCHARGE NOTE PROVIDER - NSDCFUADDINST_GEN_ALL_CORE_FT
follow up with your PMD in one week - call for appointment  follow up with your Endocrinology in one week - call for appointment    If your procedure was done through the wrist, for the next 3 days avoid pulling or pushing or repeated movement of that hand and wrist. Do not lift more than 5 pounds.  If your procedure was done through the groin, for the next 5 days, limit climbing stairs, no strenuous activity, pulling, pushing, or straining. Do not lift more than 10 pounds.  Monitor site of procedure for bleeding, pain, swelling, or discharge. Notify MD if symptoms occur. You may shower, but no baths/swimming x 5 days.  See pre-printed discharge instructions.  Follow up with your Cardiologist in 1 - 2 weeks - call for appointment       *Take Aspirin for 2 weeks only, then stop.    Follow up with your PMD in one week - call for appointment  Follow up with your Endocrinology in one week - call for appointment    If your procedure was done through the wrist, for the next 3 days avoid pulling or pushing or repeated movement of that hand and wrist. Do not lift more than 5 pounds.  If your procedure was done through the groin, for the next 5 days, limit climbing stairs, no strenuous activity, pulling, pushing, or straining. Do not lift more than 10 pounds.  Monitor site of procedure for bleeding, pain, swelling, or discharge. Notify MD if symptoms occur. You may shower, but no baths/swimming x 5 days.  See pre-printed discharge instructions.  Follow up with your Cardiologist in 1 - 2 weeks - call for appointment

## 2023-05-02 NOTE — DISCHARGE NOTE PROVIDER - PROVIDER TOKENS
PROVIDER:[TOKEN:[[id number]]] PROVIDER:[TOKEN:[[id number]]],FREE:[LAST:[Endocrinology],PHONE:[(   )    -],FAX:[(   )    -],ADDRESS:[[facility].   [address]  [address] [phone]  Call for follow up appointment]]

## 2023-05-02 NOTE — CONSULT NOTE ADULT - SUBJECTIVE AND OBJECTIVE BOX
HPI:  Cardiologist: Dr. Migue Barlow  Pharmacy: [facility] ([address] , [place] [address])    Shellfish allergy - pre-medicated w/ Prednione 50mg q8hrs x3 and Benadryl on-call to lab    64 y/o Male, former smoker, w/ PMHx of HTN, HLD, Type 2 Diabetes c/b neuropathy, CAD s/p CABG 5/2007/PCI SVG-RI 2/2009, paroxysmal AFib/flutter (s/p ablation w/ PVI; on Warfarin, last dose 4/27), known RBBB, Anxiety initially presented to outpatient cardiologist for routine cardiac evaluation. Patient reports fatigue w/ daily activities, shortness of breath/MASON, orthopnea/PND and uncontrolled blood pressure. Patient underwent TTE and nuclear stress test which were abnormal (results as below). Denies headaches, changes in vision, chest pain, palpitations, abdominal pain, N/V/D, leg pain/edema or any other complaints. In light of pt's risk factors, CCS class III anginal-equivalent symptoms and abnormal TTE/nuclear stress test; pt referred to [facility] for cardiac catheterization w/ possible intervention if clinically indicated. No implantable devices.     Review of studies:  CABG 5/11/2007  Cardiac catheterization 2/24/09:   mLM 40%, oLAD 70%, mLAD 80%, dLAD 50%, D1 99%, pCx 90%, mCx 95%, OM1 100% (small), OM2 99%, Laura 100% (supplied by bypass graft), mRCA 100% ISR.   Grafts: LIMA-dLAD, LIMA-D1, and SVG-RPDA patent; SVG-RI 90% s/p stent.   LV gram: mild diaphragmatic hypokinesis & mild posterobasal hypokinesis LVEF 60%, no AS  TTE 4/4/23: LVEF 54%, mild-modMR, trace AI, mod TR, mildly dilated LA, moderately dilated LV, G3DD, borderline pHTN (PASP 38)  Pharmacologic stress test 4/7/23: medium, mild-mod defect in basal anterior and basal anteroseptal walls suggestive of infarction w/ mild venus-infarct ischemia; small, mild-mod defect in mid inferolateral wall that is partially reversible suggestive of ischemia; post-stress LVEF 51%  EKG 4/2023: Sinus bradycardia, RBBB (01 May 2023 11:35)      Endocrinology HPI    Diabetes Mellitus Type 2  Diagnosis: 1989  Symptoms: Denies any polyuria, polydipsia  Outpatient endocrinologist: Follows with PCP   Last HgbA1c: 8.2%  Outpatient regimen: Lantus 12 units nightly + Humalog 20 units TID with meals  Compliance: Good   Blood sugars at home: 250s throughout the day   FH: Denies history of DM in the family   Tobacco, etoh, drug use: Former smoker   Diet: Not watching diet closely at home   Exercise: Minimal   History of CAD/MI/Stroke: Yes as noted above in HPI    Review of Systems:  Constitutional: No fever, good appetite/po intake  Eyes: No blurry vision, diplopia  Neuro: No tremors  HEENT: No pain  Cardiovascular: No chest pain, palpitations  Respiratory: No SOB, no cough  GI: No nausea, vomiting,   : No dysuria, hematuria  Skin: no rash  Psych: no depression  Endocrine: no polyuria, polydipsia  Hem/lymph: no swelling  Osteoporosis: no fractures    ALL OTHER SYSTEMS REVIEWED AND NEGATIVE    PHYSICAL EXAM:  VITALS: T(C): 36.7 (05-02-23 @ 12:31)  T(F): 98.1 (05-02-23 @ 12:31), Max: 98.2 (05-01-23 @ 13:50)  HR: 80 (05-02-23 @ 12:31) (60 - 80)  BP: 149/72 (05-02-23 @ 12:31) (131/66 - 171/87)  RR:  (11 - 22)  SpO2:  (93% - 98%)  Wt(kg): --  GENERAL: NAD, well-groomed, well-developed  EYES: No proptosis, extraocular movements intact,  no lid lag, anicteric  HEENT:  Atraumatic, Normocephalic, moist mucous membranes  THYROID: Normal size, no palpable nodules, no thyromegaly  RESPIRATORY: Clear to auscultation bilaterally; No rales, rhonchi, wheezing, or rubs  CARDIOVASCULAR: Regular rate and rhythm; No murmurs; no peripheral edema  GI: Soft, nontender, non distended, normal bowel sounds  SKIN: Dry, intact, No rashes or lesions  EXTREMITIES: No foot ulcers, distal pedal pulses intact bilaterally  NEURO: sensation intact, no tremors  PSYCH: reactive affect, euthymic mood  CUSHING'S SIGNS: no striae or visible bruising                              12.2   11.25 )-----------( 129      ( 01 May 2023 11:15 )             37.9       05-02    137  |  101  |  31<H>  ----------------------------<  335<H>  3.7   |  25  |  1.17    eGFR: 71    Ca    8.7      05-02        Thyroid Function Tests:          Radiology:

## 2023-05-02 NOTE — DISCHARGE NOTE PROVIDER - HOSPITAL COURSE
HPI:  Cardiologist: Dr. Migue Barlow  Pharmacy: [facility] ([address] , [place] [address])    Shellfish allergy - pre-medicated w/ Prednione 50mg q8hrs x3 and Benadryl on-call to lab    62 y/o Male, former smoker, w/ PMHx of HTN, HLD, Type 2 Diabetes c/b neuropathy, CAD s/p CABG 5/2007/PCI SVG-RI 2/2009, paroxysmal AFib/flutter (s/p ablation w/ PVI; on Warfarin, last dose 4/27), known RBBB, Anxiety initially presented to outpatient cardiologist for routine cardiac evaluation. Patient reports fatigue w/ daily activities, shortness of breath/MASON, orthopnea/PND and uncontrolled blood pressure. Patient underwent TTE and nuclear stress test which were abnormal (results as below). Denies headaches, changes in vision, chest pain, palpitations, abdominal pain, N/V/D, leg pain/edema or any other complaints. In light of pt's risk factors, CCS class III anginal-equivalent symptoms and abnormal TTE/nuclear stress test; pt referred to [facility] for cardiac catheterization w/ possible intervention if clinically indicated. No implantable devices.     Review of studies:  CABG 5/11/2007  Cardiac catheterization 2/24/09:   mLM 40%, oLAD 70%, mLAD 80%, dLAD 50%, D1 99%, pCx 90%, mCx 95%, OM1 100% (small), OM2 99%, Laura 100% (supplied by bypass graft), mRCA 100% ISR.   Grafts: LIMA-dLAD, LIMA-D1, and SVG-RPDA patent; SVG-RI 90% s/p stent.   LV gram: mild diaphragmatic hypokinesis & mild posterobasal hypokinesis LVEF 60%, no AS  TTE 4/4/23: LVEF 54%, mild-modMR, trace AI, mod TR, mildly dilated LA, moderately dilated LV, G3DD, borderline pHTN (PASP 38)  Pharmacologic stress test 4/7/23: medium, mild-mod defect in basal anterior and basal anteroseptal walls suggestive of infarction w/ mild venus-infarct ischemia; small, mild-mod defect in mid inferolateral wall that is partially reversible suggestive of ischemia; post-stress LVEF 51%  EKG 4/2023: Sinus bradycardia, RBBB (01 May 2023 11:35)    5/1 diagnostic cardiac cath. SVG to OM1: distal to prox SVG stent 80% via right femoral access site   HPI:  Cardiologist: Dr. Migue Barlow  Pharmacy: [facility] ([address] , [place] [address])    Shellfish allergy - pre-medicated w/ Prednisone 50mg q8hrs x3 and Benadryl on-call to lab    64 y/o Male, former smoker, w/ PMHx of HTN, HLD, Type 2 Diabetes c/b neuropathy, CAD s/p CABG 5/2007/PCI SVG-RI 2/2009, paroxysmal AFib/flutter (s/p ablation w/ PVI; on Warfarin, last dose 4/27), known RBBB, Anxiety initially presented to outpatient cardiologist for routine cardiac evaluation. Patient reports fatigue w/ daily activities, shortness of breath/MASON, orthopnea/PND and uncontrolled blood pressure. Patient underwent TTE and nuclear stress test which were abnormal (results as below). Denies headaches, changes in vision, chest pain, palpitations, abdominal pain, N/V/D, leg pain/edema or any other complaints. In light of pt's risk factors, CCS class III anginal-equivalent symptoms and abnormal TTE/nuclear stress test; pt referred to [facility] for cardiac catheterization w/ possible intervention if clinically indicated. No implantable devices.     Review of studies:  CABG 5/11/2007  Cardiac catheterization 2/24/09:   mLM 40%, oLAD 70%, mLAD 80%, dLAD 50%, D1 99%, pCx 90%, mCx 95%, OM1 100% (small), OM2 99%, Laura 100% (supplied by bypass graft), mRCA 100% ISR.   Grafts: LIMA-dLAD, LIMA-D1, and SVG-RPDA patent; SVG-RI 90% s/p stent.   LV gram: mild diaphragmatic hypokinesis & mild posterobasal hypokinesis LVEF 60%, no AS  TTE 4/4/23: LVEF 54%, mild-modMR, trace AI, mod TR, mildly dilated LA, moderately dilated LV, G3DD, borderline pHTN (PASP 38)  Pharmacologic stress test 4/7/23: medium, mild-mod defect in basal anterior and basal anteroseptal walls suggestive of infarction w/ mild venus-infarct ischemia; small, mild-mod defect in mid inferolateral wall that is partially reversible suggestive of ischemia; post-stress LVEF 51%  EKG 4/2023: Sinus bradycardia, RBBB (01 May 2023 11:35)    5/1 diagnostic cardiac cath. SVG to OM1: distal to prox SVG stent 80% via right femoral access site      Endo Consult  Wound Consult for bilateral LE Wounds.     5/3 s/p BRIANDA SVG-OM1 30mm (partially in SVG partially in OM) triple therapy for 2 weeks d/t length and location of stent RFA remove 1515; EDP 35 needs diuresis    Discussed with MD - pt stable for discharge home, pt with no complaints, discharge medications reviewed with MD.

## 2023-05-02 NOTE — DISCHARGE NOTE PROVIDER - CARE PROVIDER_API CALL
Migue Barlow)  Cardiovascular Disease  [address] [place] [address]  Phone: [phone]  Fax: [phone]  Follow Up Time:    Migue Barlow)  Cardiovascular Disease  [address] [place] [address]  Phone: [phone]  Fax: [phone]  Follow Up Time:     [facility]   [address] [address]   [phone]  Call for follow up appointment  Phone: (   )    -  Fax: (   )    -  Follow Up Time:    [address] Exp Problem Focused - Mod. Complex

## 2023-05-02 NOTE — DISCHARGE NOTE PROVIDER - NSDCCPTREATMENT_GEN_ALL_CORE_FT
PRINCIPAL PROCEDURE  Procedure: Left heart cardiac cath  Findings and Treatment: SVG to OM1: distal to prox SVG stent 80%

## 2023-05-02 NOTE — CHART NOTE - NSCHARTNOTEFT_GEN_A_CORE
Cardiologist: Dr. Migue Barlow  Pharmacy: [facility] ([address], [place] [address])    Shellfish allergy - pre-medicated w/ Prednione 50mg q8hrs x3 and Benadryl on-call to lab    62 y/o Male, former smoker, w/ PMHx of HTN, HLD, Type 2 Diabetes c/b neuropathy, CAD s/p CABG 5/2007/PCI SVG-RI 2/2009, paroxysmal AFib/flutter (s/p ablation w/ PVI; on Warfarin, last dose 4/27), known RBBB, Anxiety initially presented to outpatient cardiologist for routine cardiac evaluation. Patient reports fatigue w/ daily activities, shortness of breath/MASON, orthopnea/PND and uncontrolled blood pressure. Patient underwent TTE and nuclear stress test which were abnormal (results as below). Denies headaches, changes in vision, chest pain, palpitations, abdominal pain, N/V/D, leg pain/edema or any other complaints. In light of pt's risk factors, CCS class III anginal-equivalent symptoms and abnormal TTE/nuclear stress test; pt referred to [facility] for cardiac catheterization w/ possible intervention if clinically indicated. No implantable devices.     5/1 S/P Diagnostic Cath-LIMA to mLAD/D1 patent, SVG to OM1 distal to prox SVG stent 80%, , SVG to RPDA patent  LVEDP 39      PHYSICAL EXAM:  Constitutional: NAD, awake and alert, well-developed  HEENT: Moist Mucous Membranes, Anicteric  Pulmonary: Non-labored, breath sounds diminished Right lower base, , No wheezing, rales or rhonchi  Cardiovascular: Regular, S1 and S2, No murmurs, rubs, gallops or clicks  Gastrointestinal: Bowel Sounds present, soft, nontender.   Lymph: No peripheral edema. No lymphadenopathy.  Skin: No visible rashes or ulcers.  Psych:  Mood & affect appropriate  Extremities: +2 edema, with wounds on bilateral LE. Pt reports he takes care of Wounds himself.    Plan  -Lasix 40 mg IV X 1  -Staged PCI post Diuresis  -Heparin gtt for Afib  -Wound Consult  -BG > 300, Endo Consult Cardiologist: Dr. Migue Barlow  Pharmacy: [facility] ([address] , [place] [address])    Shellfish allergy - pre-medicated w/ Prednione 50mg q8hrs x3 and Benadryl on-call to lab    62 y/o Male, former smoker, w/ PMHx of HTN, HLD, Type 2 Diabetes c/b neuropathy, CAD s/p CABG 5/2007/PCI SVG-RI 2/2009, paroxysmal AFib/flutter (s/p ablation w/ PVI; on Warfarin, last dose 4/27), known RBBB, Anxiety initially presented to outpatient cardiologist for routine cardiac evaluation. Patient reports fatigue w/ daily activities, shortness of breath/MASON, orthopnea/PND and uncontrolled blood pressure. Patient underwent TTE and nuclear stress test which were abnormal (results as below). Denies headaches, changes in vision, chest pain, palpitations, abdominal pain, N/V/D, leg pain/edema or any other complaints. In light of pt's risk factors, CCS class III anginal-equivalent symptoms and abnormal TTE/nuclear stress test; pt referred to [facility] for cardiac catheterization w/ possible intervention if clinically indicated. No implantable devices.     5/1 S/P Diagnostic Cath-LIMA to mLAD/D1 patent, SVG to OM1 distal to prox SVG stent 80%, , SVG to RPDA patent  LVEDP 39      PHYSICAL EXAM:  Constitutional: NAD, awake and alert, well-developed  HEENT: Moist Mucous Membranes, Anicteric  Pulmonary: Non-labored, breath sounds diminished Right lower base, , No wheezing, rales or rhonchi  Cardiovascular: Regular, S1 and S2, No murmurs, rubs, gallops or clicks  Gastrointestinal: Bowel Sounds present, soft, nontender.   Lymph: No peripheral edema. No lymphadenopathy.  Skin: No visible rashes or ulcers.  Psych:  Mood & affect appropriate  Extremities: +2 edema, with wounds on bilateral LE. Pt reports he takes care of Wounds himself.    Plan  -Lasix 40 mg IV X 1, another dose of Lasix iv @ 1800 hrs  -Staged PCI post Diuresis  -Heparin gtt for Afib,  -130 bpm, Lopressor 5 mg iv x 1  -Wound Consult  -BG > 300, Endo Consult  Pt reports no allergies or intolerance to Statins, not on Statins @ Home?  Will Start Lipitor 80 mg HS daily

## 2023-05-02 NOTE — CONSULT NOTE ADULT - ASSESSMENT
Thierno is a 64 y/o Male, former smoker, w/ PMHx of HTN, HLD, Type 2 Diabetes c/b neuropathy, CAD s/p CABG 5/2007/PCI SVG-RI 2/2009, paroxysmal AFib/flutter (s/p ablation w/ PVI; on Warfarin, last dose 4/27), known RBBB, who presented with worsening dyspnea on exertion. Pharm stress test with inferolateral ischemia, and he is now s/p cath.    - s/p cath with severe native disease  - 90% lesion in the SVG to ramus distal to prior stent  - pci to be rescheduled after diuresis  - asa 81  - not on statin?    - lvedp elevated at 39  - continues to have jennie edema, and dyspnea on exertion  - give Lasix 40 iv x 1, and another dose later today  - echo in office with ef 54%  - monitor i/o's and weights  - trend creatinine and electrolytes. Keep K>4, mg>2    - history of paf, in sr  - cont heparin gtt for ac    - bp elevated, though he is nervous about situation  - cont losartan, norvasc, sotalol and hydralazine, which can be titrated up as needed    - will follow with you

## 2023-05-02 NOTE — DISCHARGE NOTE PROVIDER - NSDCMRMEDTOKEN_GEN_ALL_CORE_FT
amLODIPine 10 mg oral tablet: 1 tab(s) orally once a day  Coumadin 2 mg oral tablet: 1 tab(s) orally Sun, Tues, Wed, Thurs, Sat  Coumadin 2.5 mg oral tablet: 1 tab(s) orally Mon and Fri  HumaLOG 100 units/mL subcutaneous solution: 20 unit(s) subcutaneous 3 times a day (before meals)  hydrALAZINE 25 mg oral tablet: 1 tab(s) orally 2 times a day  Lantus 100 units/mL subcutaneous solution: 12 international unit(s) subcutaneous once a day (at bedtime)  losartan 100 mg oral tablet: 1 tab(s) orally once a day (at bedtime)  sotalol 120 mg oral tablet: 0.5 tab(s) orally 2 times a day   amLODIPine 10 mg oral tablet: 1 tab(s) orally once a day  Coumadin 2 mg oral tablet: 1 tab(s) orally Sun, Tues, Wed, Thurs, Sat  Coumadin 2.5 mg oral tablet: 1 tab(s) orally Mon and Fri  HumaLOG 100 units/mL subcutaneous solution: 20 unit(s) subcutaneous 3 times a day (before meals)  hydrALAZINE 25 mg oral tablet: 1 tab(s) orally 2 times a day  Lantus 100 units/mL subcutaneous solution: 12 international unit(s) subcutaneous once a day (at bedtime)  losartan 100 mg oral tablet: 1 tab(s) orally once a day (at bedtime)  Ozempic 2 mg/1.5 mL (0.25 mg or 0.5 mg dose) subcutaneous solution: 0.25 milligram(s) subcutaneously once a week  sotalol 120 mg oral tablet: 0.5 tab(s) orally 2 times a day  Trulicity Pen 0.75 mg/0.5 mL subcutaneous solution: 0.75 milligram(s) subcutaneously once a week   amLODIPine 10 mg oral tablet: 1 tab(s) orally once a day  apixaban 5 mg oral tablet: 1 tab(s) orally every 12 hours  aspirin 81 mg oral delayed release tablet: 1 tab(s) orally once a day  atorvastatin 80 mg oral tablet: 1 tab(s) orally once a day (at bedtime)  bacitracin 500 units/g topical ointment: 1 Apply topically to affected area 2 times a day  clopidogrel 75 mg oral tablet: 1 tab(s) orally once a day  HumaLOG 100 units/mL subcutaneous solution: 18 unit(s) subcutaneous 3 times a day (before meals)  hydrALAZINE 25 mg oral tablet: 1 tab(s) orally 2 times a day  insulin glargine 100 units/mL subcutaneous solution: 16 unit(s) subcutaneous once a day (at bedtime)  Lasix 40 mg oral tablet: 1 tab(s) orally once a day  losartan 100 mg oral tablet: 1 tab(s) orally once a day (at bedtime)  Ozempic 2 mg/1.5 mL (0.25 mg or 0.5 mg dose) subcutaneous solution: 0.25 milligram(s) subcutaneously once a week  pantoprazole 40 mg oral delayed release tablet: 1 tab(s) orally once a day (before a meal)  sotalol 120 mg oral tablet: 0.5 tab(s) orally 2 times a day

## 2023-05-02 NOTE — DISCHARGE NOTE PROVIDER - NSDCCPCAREPLAN_GEN_ALL_CORE_FT
PRINCIPAL DISCHARGE DIAGNOSIS  Diagnosis: CAD (coronary artery disease)  Assessment and Plan of Treatment: No heavy lifting, strenuous activity, bending, straining, or unnecessary stair climbing for 2 weeks. No driving for 2 days. You may shower 24 hours following the procedure but avoid baths/swimming for 1 week. Check your groin site for bleeding and/or swelling daily following procedure and call your doctor immediately if it occurs or if you experience increased pain at the site. Follow up with your cardiologist in 1-2 weeks. You may call [facility] if you have any questions/concerns regarding your procedure [phone].      SECONDARY DISCHARGE DIAGNOSES  Diagnosis: HTN (hypertension)  Assessment and Plan of Treatment: Continue with your blood pressure medications; eat a heart healthy diet with low salt diet; exercise regularly (consult with your physician or cardiologist first); maintain a heart healthy weight; if you smoke - quit (A resource to help you stop smoking is the [facility] [employer] [facility] – phone number [phone].); include healthy ways to manage stress. Continue to follow with your primary care physician or cardiologist.    Diagnosis: HLD (hyperlipidemia)  Assessment and Plan of Treatment: Continue with your cholesterol medications. Eat a heart healthy diet that is low in saturated fats and salt, and includes whole grains, fruits, vegetables and lean protein; exercise regularly (consult with your physician or cardiologist first); maintain a heart healthy weight; if you smoke - quit (A resource to help you stop smoking is the [facility] – phone number [phone].). Continue to follow with your primary physician or cardiologist.    Diagnosis: Atrial fibrillation  Assessment and Plan of Treatment: Continue with your cardiologist and primary care MD. Continue your current medications. Call your physician for palpitations, feelings of rapid heart beat, lightheadedness, or dizziness. If you are on warfarin (Coumadin), have your blood work drawn (prescription given) on ________________. Ask your nurse for written material about Coumadin and to provide patient education before discharge.

## 2023-05-02 NOTE — CONSULT NOTE ADULT - SUBJECTIVE AND OBJECTIVE BOX
[facility] - Yen Negrete, Eh Rucker SAvella  Office Number: [phone]    Initial Consult Note    CHIEF COMPLAINT: Patient is a 63y old  Male who presents with a chief complaint of abnormal stress test (02 May 2023 05:39)      HPI:  Cardiologist: Dr. Migue Barlow  Pharmacy: [facility] ([address] [place] [address])    Shellfish allergy - pre-medicated w/ Prednione 50mg q8hrs x3 and Benadryl on-call to lab    64 y/o Male, former smoker, w/ PMHx of HTN, HLD, Type 2 Diabetes c/b neuropathy, CAD s/p CABG 5/2007/PCI SVG-RI 2/2009, paroxysmal AFib/flutter (s/p ablation w/ PVI; on Warfarin, last dose 4/27), known RBBB, Anxiety initially presented to outpatient cardiologist for routine cardiac evaluation. Patient reports fatigue w/ daily activities, shortness of breath/MASON, orthopnea/PND and uncontrolled blood pressure. Patient underwent TTE and nuclear stress test which were abnormal (results as below). Denies headaches, changes in vision, chest pain, palpitations, abdominal pain, N/V/D, leg pain/edema or any other complaints. In light of pt's risk factors, CCS class III anginal-equivalent symptoms and abnormal TTE/nuclear stress test; pt referred to [facility] for cardiac catheterization w/ possible intervention if clinically indicated. No implantable devices.     Review of studies:  CABG 5/11/2007  Cardiac catheterization 2/24/09:   mLM 40%, oLAD 70%, mLAD 80%, dLAD 50%, D1 99%, pCx 90%, mCx 95%, OM1 100% (small), OM2 99%, Laura 100% (supplied by bypass graft), mRCA 100% ISR.   Grafts: LIMA-dLAD, LIMA-D1, and SVG-RPDA patent; SVG-RI 90% s/p stent.   LV gram: mild diaphragmatic hypokinesis & mild posterobasal hypokinesis LVEF 60%, no AS  TTE 4/4/23: LVEF 54%, mild-modMR, trace AI, mod TR, mildly dilated LA, moderately dilated LV, G3DD, borderline pHTN (PASP 38)  Pharmacologic stress test 4/7/23: medium, mild-mod defect in basal anterior and basal anteroseptal walls suggestive of infarction w/ mild venus-infarct ischemia; small, mild-mod defect in mid inferolateral wall that is partially reversible suggestive of ischemia; post-stress LVEF 51%  EKG 4/2023: Sinus bradycardia, RBBB (01 May 2023 11:35)      PAST MEDICAL & SURGICAL HISTORY:  Afib      Hypertension      Diabetes mellitus      CAD (coronary artery disease)      High cholesterol      Myocardial infarct      Diabetic neuropathy      Ulcer of left lower extremity      History of right bundle branch block (RBBB)      S/P CABG x 4      S/P coronary artery stent placement          SOCIAL HISTORY:  former smoker, ethanol, or drug abuse.    FAMILY HISTORY:  Family history of lung cancer (Mother)      No family history of acute MI or sudden cardiac death.    MEDICATIONS  (STANDING):  amLODIPine   Tablet 10 milliGRAM(s) Oral daily  dextrose 5%. 1000 milliLiter(s) (50 mL/Hr) IV Continuous <Continuous>  dextrose 5%. 1000 milliLiter(s) (100 mL/Hr) IV Continuous <Continuous>  dextrose 50% Injectable 25 Gram(s) IV Push once  dextrose 50% Injectable 12.5 Gram(s) IV Push once  dextrose 50% Injectable 25 Gram(s) IV Push once  glucagon  Injectable 1 milliGRAM(s) IntraMuscular once  heparin  Infusion.  Unit(s)/Hr (23 mL/Hr) IV Continuous <Continuous>  hydrALAZINE 25 milliGRAM(s) Oral two times a day  insulin glargine Injectable (LANTUS) 9 Unit(s) SubCutaneous at bedtime  insulin lispro (ADMELOG) corrective regimen sliding scale   SubCutaneous three times a day before meals  insulin lispro (ADMELOG) corrective regimen sliding scale   SubCutaneous at bedtime  insulin lispro Injectable (ADMELOG) 16 Unit(s) SubCutaneous three times a day before meals  losartan 100 milliGRAM(s) Oral daily  sodium chloride 0.9%. 1000 milliLiter(s) (50 mL/Hr) IV Continuous <Continuous>  sotalol. 60 milliGRAM(s) Oral every 12 hours    MEDICATIONS  (PRN):  dextrose Oral Gel 15 Gram(s) Oral once PRN Blood Glucose LESS THAN 70 milliGRAM(s)/deciliter  heparin   Injectable 88385 Unit(s) IV Push every 6 hours PRN For aPTT less than 40  heparin   Injectable 5000 Unit(s) IV Push every 6 hours PRN For aPTT between 40 - 57      Allergies    No Known Drug Allergies  shellfish (Swelling)    Intolerances    Tomatoes (Diarrhea)  Glucophage (Diarrhea)  Cardizem (Other)      REVIEW OF SYSTEMS:    CONSTITUTIONAL: No weakness, fevers or chills  EYES/ENT: No visual changes;  No vertigo or throat pain   NECK: No pain or stiffness  RESPIRATORY: No cough, wheezing, hemoptysis; reports shortness of breath  CARDIOVASCULAR: No chest pain or palpitations  GASTROINTESTINAL: No abdominal pain. No nausea, vomiting, or hematemesis; No diarrhea or constipation. No melena or hematochezia.  GENITOURINARY: No dysuria, frequency or hematuria  NEUROLOGICAL: No numbness or weakness  SKIN: No itching or rash  All other review of systems is negative unless indicated above    VITAL SIGNS:   Vital Signs Last 24 Hrs  T(C): 36.7 (02 May 2023 07:56), Max: 37.1 (01 May 2023 10:57)  T(F): 98.1 (02 May 2023 07:56), Max: 98.7 (01 May 2023 10:57)  HR: 64 (02 May 2023 07:56) (60 - 75)  BP: 160/79 (02 May 2023 07:56) (131/66 - 171/87)  BP(mean): 102 (02 May 2023 07:56) (82 - 112)  RR: 17 (02 May 2023 07:56) (11 - 22)  SpO2: 96% (02 May 2023 07:56) (93% - 98%)    Parameters below as of 02 May 2023 07:56  Patient On (Oxygen Delivery Method): room air        I&O's Summary    01 May 2023 07:01  -  02 May 2023 07:00  --------------------------------------------------------  IN: 484 mL / OUT: 1550 mL / NET: -1066 mL        On Exam:    Constitutional: NAD, alert and oriented x 3  Lungs:  Non-labored, breath sounds are clear bilaterally, No wheezing, rales or rhonchi  Cardiovascular: RRR.  S1 and S2 positive.  No murmurs, rubs, gallops or clicks  Gastrointestinal: Bowel Sounds present, soft, nontender.   Lymph: mild to mod peripheral edema with jennie wounds. No cervical lymphadenopathy.  Neurological: Alert, no focal deficits  Skin: No rashes or ulcers   Psych:  Mood & affect appropriate.    LABS: All Labs Reviewed:                        12.2   11.25 )-----------( 129      ( 01 May 2023 11:15 )             37.9     02 May 2023 05:35    137    |  101    |  31     ----------------------------<  335    3.7     |  25     |  1.17   01 May 2023 11:15    136    |  104    |  29     ----------------------------<  390    4.2     |  20     |  1.07     Ca    8.7        02 May 2023 05:35  Ca    9.0        01 May 2023 11:15      PT/INR - ( 02 May 2023 05:35 )   PT: 20.4 sec;   INR: 1.75 ratio         PTT - ( 02 May 2023 05:35 )  PTT:185.9 sec      Blood Culture:         RADIOLOGY:    tele: sr

## 2023-05-02 NOTE — DISCHARGE NOTE PROVIDER - CARE PROVIDERS DIRECT ADDRESSES
[email or web address]_Unknown

## 2023-05-02 NOTE — CONSULT NOTE ADULT - ASSESSMENT
A/P: 64 y/o Male, former smoker, w/ PMHx of HTN, HLD, Type 2 Diabetes c/b neuropathy, CAD s/p CABG 5/2007/PCI SVG-RI 2/2009, paroxysmal AFib/flutter (s/p ablation w/ PVI; on Warfarin, last dose 4/27), known RBBB, Anxiety initially presented for shortness of breath. Patient is high risk with high level decision making due to uncontrolled diabetes which places patient at high risk for cardiovascular and cerebrovascular events. Patient with lability of glucose requiring close monitoring and insulin adjustments.  Endocrinology consulted for diabetes management.     #Type 2 Diabetes Mellitus  - HbA1c 8.2% ; home regimen: Lantus 12 units nightly + Humalog 20 units TID with meals  -egfr: 71  - Of note patient has a contrast allergy, and took 3 doses of Prednsione 50mg prior to coming in which is likely worsening his blood sugars    Plan:   - Recommend increase to 15 units of lantus QHS  - Increase to 20 untis units of Admelog TIDQAC  - Recommend moderate Admelog correction scale TIDQAC and QHS  - Please check FSG before meals and QHS, or q6h while NPO  - Inpatient glucose goal 100-180   - Please keep patient on a diabetic, carb controlled diet     - RD consult  - hypoglycemia orderset prn  - extensively discussed importance of glycemic control to prevent micro- and macrovascular complications  - discussed importance of weight loss, exercise, and changing diet     - Discharge planning: Basal bolus + would initiate GLP-1 agonist given significant cardiac history and BMI of 41. Please check if Trulicity 0.75mg weekly or Ozempic 0.25mg weekly is covered     #Hypertension  - BP goal <130/80  - Management as per primary team    #Hyperlipidemia  - Continue with Atorvastatin 80mg daily    Elise Tran,   Attending Physician   Department of Endocrinology, Diabetes and Metabolism     If before 9AM or after 5PM, or on weekends/holidays, please call the Endocrine answering service for assistance ([phone]).  For nonurgent matters, please email [email or web address] for assistance.     Please note that a different provider may be following this patient each day.

## 2023-05-03 LAB
APTT BLD: 101.9 SEC — HIGH (ref 27.5–35.5)
APTT BLD: 67.6 SEC — HIGH (ref 27.5–35.5)
GLUCOSE BLDC GLUCOMTR-MCNC: 151 MG/DL — HIGH (ref 70–99)
GLUCOSE BLDC GLUCOMTR-MCNC: 178 MG/DL — HIGH (ref 70–99)
GLUCOSE BLDC GLUCOMTR-MCNC: 190 MG/DL — HIGH (ref 70–99)
GLUCOSE BLDC GLUCOMTR-MCNC: 305 MG/DL — HIGH (ref 70–99)

## 2023-05-03 PROCEDURE — 99222 1ST HOSP IP/OBS MODERATE 55: CPT

## 2023-05-03 PROCEDURE — 99232 SBSQ HOSP IP/OBS MODERATE 35: CPT

## 2023-05-03 PROCEDURE — 93010 ELECTROCARDIOGRAM REPORT: CPT | Mod: 76

## 2023-05-03 PROCEDURE — 99233 SBSQ HOSP IP/OBS HIGH 50: CPT

## 2023-05-03 RX ORDER — HYDRALAZINE HCL 50 MG
5 TABLET ORAL ONCE
Refills: 0 | Status: COMPLETED | OUTPATIENT
Start: 2023-05-03 | End: 2023-05-03

## 2023-05-03 RX ORDER — ASPIRIN/CALCIUM CARB/MAGNESIUM 324 MG
81 TABLET ORAL DAILY
Refills: 0 | Status: DISCONTINUED | OUTPATIENT
Start: 2023-05-04 | End: 2023-05-04

## 2023-05-03 RX ORDER — FUROSEMIDE 40 MG
40 TABLET ORAL ONCE
Refills: 0 | Status: DISCONTINUED | OUTPATIENT
Start: 2023-05-03 | End: 2023-05-03

## 2023-05-03 RX ORDER — PANTOPRAZOLE SODIUM 20 MG/1
40 TABLET, DELAYED RELEASE ORAL
Refills: 0 | Status: DISCONTINUED | OUTPATIENT
Start: 2023-05-03 | End: 2023-05-04

## 2023-05-03 RX ORDER — CLOPIDOGREL BISULFATE 75 MG/1
75 TABLET, FILM COATED ORAL DAILY
Refills: 0 | Status: DISCONTINUED | OUTPATIENT
Start: 2023-05-04 | End: 2023-05-04

## 2023-05-03 RX ORDER — FUROSEMIDE 40 MG
40 TABLET ORAL ONCE
Refills: 0 | Status: COMPLETED | OUTPATIENT
Start: 2023-05-03 | End: 2023-05-03

## 2023-05-03 RX ORDER — APIXABAN 2.5 MG/1
5 TABLET, FILM COATED ORAL EVERY 12 HOURS
Refills: 0 | Status: DISCONTINUED | OUTPATIENT
Start: 2023-05-04 | End: 2023-05-04

## 2023-05-03 RX ORDER — ACETAMINOPHEN 500 MG
1000 TABLET ORAL ONCE
Refills: 0 | Status: COMPLETED | OUTPATIENT
Start: 2023-05-03 | End: 2023-05-03

## 2023-05-03 RX ORDER — BACITRACIN ZINC 500 UNIT/G
1 OINTMENT IN PACKET (EA) TOPICAL
Refills: 0 | Status: DISCONTINUED | OUTPATIENT
Start: 2023-05-03 | End: 2023-05-04

## 2023-05-03 RX ORDER — SEMAGLUTIDE 0.68 MG/ML
0.25 INJECTION, SOLUTION SUBCUTANEOUS
Qty: 1 | Refills: 0
Start: 2023-05-03

## 2023-05-03 RX ORDER — LIDOCAINE 4 G/100G
1 CREAM TOPICAL ONCE
Refills: 0 | Status: COMPLETED | OUTPATIENT
Start: 2023-05-03 | End: 2023-05-03

## 2023-05-03 RX ORDER — ACETAMINOPHEN 500 MG
650 TABLET ORAL ONCE
Refills: 0 | Status: COMPLETED | OUTPATIENT
Start: 2023-05-03 | End: 2023-05-03

## 2023-05-03 RX ADMIN — AMLODIPINE BESYLATE 10 MILLIGRAM(S): 2.5 TABLET ORAL at 04:45

## 2023-05-03 RX ADMIN — INSULIN GLARGINE 15 UNIT(S): 100 INJECTION, SOLUTION SUBCUTANEOUS at 21:46

## 2023-05-03 RX ADMIN — ATORVASTATIN CALCIUM 80 MILLIGRAM(S): 80 TABLET, FILM COATED ORAL at 21:47

## 2023-05-03 RX ADMIN — Medication 20 UNIT(S): at 07:26

## 2023-05-03 RX ADMIN — Medication 650 MILLIGRAM(S): at 22:32

## 2023-05-03 RX ADMIN — Medication 60 MILLIGRAM(S): at 04:45

## 2023-05-03 RX ADMIN — PANTOPRAZOLE SODIUM 40 MILLIGRAM(S): 20 TABLET, DELAYED RELEASE ORAL at 07:51

## 2023-05-03 RX ADMIN — Medication 2: at 21:46

## 2023-05-03 RX ADMIN — Medication 1: at 17:19

## 2023-05-03 RX ADMIN — Medication 400 MILLIGRAM(S): at 14:25

## 2023-05-03 RX ADMIN — Medication 25 MILLIGRAM(S): at 04:46

## 2023-05-03 RX ADMIN — Medication 650 MILLIGRAM(S): at 23:02

## 2023-05-03 RX ADMIN — Medication 40 MILLIGRAM(S): at 16:59

## 2023-05-03 RX ADMIN — HEPARIN SODIUM 1200 UNIT(S)/HR: 5000 INJECTION INTRAVENOUS; SUBCUTANEOUS at 05:20

## 2023-05-03 RX ADMIN — LIDOCAINE 1 APPLICATION(S): 4 CREAM TOPICAL at 19:00

## 2023-05-03 RX ADMIN — Medication 1000 MILLIGRAM(S): at 15:00

## 2023-05-03 RX ADMIN — Medication 25 MILLIGRAM(S): at 17:01

## 2023-05-03 RX ADMIN — Medication 1: at 07:25

## 2023-05-03 RX ADMIN — Medication 5 MILLIGRAM(S): at 15:03

## 2023-05-03 RX ADMIN — LOSARTAN POTASSIUM 100 MILLIGRAM(S): 100 TABLET, FILM COATED ORAL at 04:46

## 2023-05-03 RX ADMIN — Medication 1: at 11:40

## 2023-05-03 RX ADMIN — Medication 60 MILLIGRAM(S): at 17:01

## 2023-05-03 NOTE — CONSULT NOTE ADULT - NS_MD_PANP_GEN_ALL_CORE
Attending and PA/NP shared services statement (NON-critical care): The patient is a 57y Male complaining of hip pain/injury.

## 2023-05-03 NOTE — PHYSICAL THERAPY INITIAL EVALUATION ADULT - MODALITIES TREATMENT COMMENTS
p/w BLE swelling and venus ulcers/blisters, adaptic and aquacel applied to openings, raoul to secure and ace wrap in figure 8 pattern to BLE.

## 2023-05-03 NOTE — PHYSICAL THERAPY INITIAL EVALUATION ADULT - PERTINENT HX OF CURRENT PROBLEM, REHAB EVAL
62 y/o Male, former smoker, w/ PMHx of HTN, HLD, Type 2 Diabetes c/b neuropathy, CAD s/p CABG 5/2007/PCI SVG-RI 2/2009, paroxysmal AFib/flutter (s/p ablation w/ PVI; on Warfarin, last dose 4/27), known RBBB, Anxiety initially presented to outpatient cardiologist for routine cardiac evaluation. Patient reports fatigue w/ daily activities, shortness of breath/MASON, orthopnea/PND and uncontrolled blood pressure. Patient underwent TTE and nuclear stress test which were abnormal (results as below). Denies headaches, changes in vision, chest pain, palpitations, abdominal pain, N/V/D, leg pain/edema or any other complaints. In light of pt's risk factors, CCS class III anginal-equivalent symptoms and abnormal TTE/nuclear stress test; pt referred to [facility] for cardiac catheterization w/ possible intervention if clinically indicated. No implantable devices.

## 2023-05-03 NOTE — CONSULT NOTE ADULT - ASSESSMENT
A/P: 64 y/o Male, former smoker, w/ PMHx of HTN, HLD, Type 2 Diabetes c/b neuropathy, CAD s/p CABG 5/2007/PCI SVG-RI 2/2009, paroxysmal AFib/flutter (s/p ablation w/ PVI; on Warfarin, last dose 4/27), known RBBB, who presented with worsening dyspnea on exertion. Pharm stress test with inferolateral ischemia, and he is now s/p cath.      Wound Consult requested to assist w/ management of BLE PVD w/ stasis dermatitis  BLE wounds    BLE Adaptic / Aquacel dressing QD  BLE elevation & Compression  Consider BREE/PVR  Abx per Medicine/ ID  Moisturize intact skin w/ SWEEN cream BID  Nutrition Consult for optimization          encourage high quality protein, MVI & Vit C to promote wound healing  Hyperglycemia -  ADA diet and Lantus& FS w/ ISS,  appreciate Endo Consult  Continue turning and positioning w/ offloading assistive devices as per protocol  Buttocks/ Sacrum  Continue w/ attends under pads and Pericare as per protocol  Waffle Cushion to chair when oob to chair  Continue w/ low air loss pressure redistribution bed surface   Care as per medicine, will follow w/ you  Upon discharge f/u as outpatient at Wound Center [address] [place] [address] [phone]  Seen w/ attng and D/w team & RN  Thank you for this consult  Bobbi Dolan PA-C [id number]  Nights/ Weekends/ Holidays please call:  General Surgery Consult pager ([phone]) for emergencies  Wound PT for multilayer leg wrapping or VAC issues (x [phone])   I spent 55 minutes face to face w/ this pt of which more than 50% of the time was spent counseling & coordinating care of this pt.  A/P: 64 y/o Male, former smoker, w/ PMHx of HTN, HLD, Type 2 Diabetes c/b neuropathy, CAD s/p CABG 5/2007/PCI SVG-RI 2/2009, paroxysmal AFib/flutter (s/p ablation w/ PVI; on Warfarin, last dose 4/27), known RBBB, who presented with worsening dyspnea on exertion. Pharm stress test with inferolateral ischemia, and he is now s/p cath.      Wound Consult requested to assist w/ management of:  BLE PVD w/ stasis dermatitis  BLE wounds    BLE Adaptic / Aquacel dressing QD  BLE elevation & Compression  Consider BREE/PVR  Abx per Medicine/ ID  Moisturize intact skin w/ SWEEN cream BID  Nutrition Consult for optimization          encourage high quality protein, MVI & Vit C to promote wound healing  Hyperglycemia -  ADA diet and Lantus& FS w/ ISS,  appreciate Endo Consult  Continue turning and positioning w/ offloading assistive devices as per protocol  Buttocks/ Sacrum  Continue w/ attends under pads and Pericare as per protocol  Waffle Cushion to chair when oob to chair  Continue w/ low air loss pressure redistribution bed surface   Care as per medicine, will follow w/ you  Upon discharge f/u as outpatient at Wound Center [address] [facility] [phone]  Seen w/ attng and D/w team & RN  Thank you for this consult  Bobbi Dolan PA-C CWS [id number]  Nights/ Weekends/ Holidays please call:  General Surgery Consult pager ([phone]) for emergencies  Wound PT for multilayer leg wrapping or VAC issues (x [phone])

## 2023-05-03 NOTE — PROGRESS NOTE ADULT - ASSESSMENT
Thierno is a 62 y/o Male, former smoker, w/ PMHx of HTN, HLD, Type 2 Diabetes c/b neuropathy, CAD s/p CABG 5/2007/PCI SVG-RI 2/2009, paroxysmal AFib/flutter (s/p ablation w/ PVI; on Warfarin, last dose 4/27), known RBBB, who presented with worsening dyspnea on exertion. Pharm stress test with inferolateral ischemia, and he is now s/p cath.    - s/p cath with severe native disease  - 90% lesion in the SVG to ramus distal to prior stent  - asa 81  - lvedp elevated at 39  - continues to have chronic jennie edema  - s/p IV lasix, mild SCr increase likely diuretic effect  - echo in office with ef 54%  - monitor i/o's and weights  - trend creatinine and electrolytes. Keep K>4, mg>2    - history of paf, in sr  - cont heparin gtt for ac    - bp elevated, though he is nervous about situation  - cont losartan, norvasc, sotalol and hydralazine, which can be titrated up as needed    plan for PCI today if remains stable    - will follow with you

## 2023-05-03 NOTE — CONSULT NOTE ADULT - NS ATTEND AMEND GEN_ALL_CORE FT
Pt seen and examined with ACP.  Assessment and plan reviewed and discussed.  Agree with above.    Status of wounds and treatment recommendations d/w  pt and pt's wife at bedside.  All questions answered.   Pt and wife expressed understanding.    I spent  56 minutes face to face w/ this pt of which more than 50% of the time was spent counseling & coordinating care of this pt.

## 2023-05-03 NOTE — CHART NOTE - NSCHARTNOTEFT_GEN_A_CORE
Shellfish allergy - pre-medicated w/ Prednione 50mg q8hrs x3 and Benadryl on-call to lab    62 y/o Male, former smoker, w/ PMHx of HTN, HLD, Type 2 Diabetes c/b neuropathy, CAD s/p CABG 5/2007/PCI SVG-RI 2/2009, paroxysmal AFib/flutter (s/p ablation w/ PVI; on Warfarin, last dose 4/27), known RBBB, Anxiety initially presented to outpatient cardiologist for routine cardiac evaluation. Patient reports fatigue w/ daily activities, shortness of breath/MASON, orthopnea/PND and uncontrolled blood pressure. Patient underwent TTE and nuclear stress test which were abnormal (results as below). Denies headaches, changes in vision, chest pain, palpitations, abdominal pain, N/V/D, leg pain/edema or any other complaints. In light of pt's risk factors, CCS class III anginal-equivalent symptoms and abnormal TTE/nuclear stress test; pt referred to [facility] for cardiac catheterization w/ possible intervention if clinically indicated. No implantable devices.     Review of studies:  CABG 5/11/2007  Cardiac catheterization 2/24/09:   mLM 40%, oLAD 70%, mLAD 80%, dLAD 50%, D1 99%, pCx 90%, mCx 95%, OM1 100% (small), OM2 99%, Laura 100% (supplied by bypass graft), mRCA 100% ISR.   Grafts: LIMA-dLAD, LIMA-D1, and SVG-RPDA patent; SVG-RI 90% s/p stent.   LV gram: mild diaphragmatic hypokinesis & mild posterobasal hypokinesis LVEF 60%, no AS  TTE 4/4/23: LVEF 54%, mild-modMR, trace AI, mod TR, mildly dilated LA, moderately dilated LV, G3DD, borderline pHTN (PASP 38)  Pharmacologic stress test 4/7/23: medium, mild-mod defect in basal anterior and basal anteroseptal walls suggestive of infarction w/ mild venus-infarct ischemia; small, mild-mod defect in mid inferolateral wall that is partially reversible suggestive of ischemia; post-stress LVEF 51%  EKG 4/2023: Sinus bradycardia, RBBB (01 May 2023 11:35)    5/1 diagnostic cath  SVG to OM1: distal to prox SVG stent 80%   5/3 Staged PCI SVG to OM1   Triple Therapy (A/P/E) for 2 weeks then Plavix/Eliquis     Pt was seen by Endo for high BG  Recommended Trulicity 0.75mg weekly or Ozempic 0.25mg weekly.  Both meds need PA    Pt has a history of taking Metformin long time back which caused diarrhea. Pt was discontinued on Metformin.

## 2023-05-03 NOTE — PROGRESS NOTE ADULT - ASSESSMENT
62 y/o Male, former smoker, w/ PMHx of HTN, HLD, Type 2 Diabetes c/b neuropathy, CAD s/p CABG 5/2007/PCI SVG-RI 2/2009, paroxysmal AFib/flutter (s/p ablation w/ PVI; on Warfarin, last dose 4/27), known RBBB, Anxiety initially presented for shortness of breath now s/p cath and PCI. On basal/bolus w/BG values improved on adjust regimen over past 24 hours. Pt would benefit from GLP-1 for CV benefit/lower insulin burden w/subsequent weight loss. BG goal (100-180mg/dl).       HbA1c 8.2% ; home regimen: Lantus 12 units nightly + Humalog 20 units TID with meals  -egfr: 71

## 2023-05-03 NOTE — CONSULT NOTE ADULT - SUBJECTIVE AND OBJECTIVE BOX
Wound SURGERY CONSULT NOTE    HPI:  Cardiologist: Dr. Migue Barlow  Pharmacy: [facility] ([address] , [place] [address])    Shellfish allergy - pre-medicated w/ Prednione 50mg q8hrs x3 and Benadryl on-call to lab    64 y/o Male, former smoker, w/ PMHx of HTN, HLD, Type 2 Diabetes c/b neuropathy, CAD s/p CABG 5/2007/PCI SVG-RI 2/2009, paroxysmal AFib/flutter (s/p ablation w/ PVI; on Warfarin, last dose 4/27), known RBBB, Anxiety initially presented to outpatient cardiologist for routine cardiac evaluation. Patient reports fatigue w/ daily activities, shortness of breath/MASON, orthopnea/PND and uncontrolled blood pressure. Patient underwent TTE and nuclear stress test which were abnormal (results as below). Denies headaches, changes in vision, chest pain, palpitations, abdominal pain, N/V/D, leg pain/edema or any other complaints. In light of pt's risk factors, CCS class III anginal-equivalent symptoms and abnormal TTE/nuclear stress test; pt referred to [facility] for cardiac catheterization w/ possible intervention if clinically indicated. No implantable devices.     Review of studies:  CABG 5/11/2007  Cardiac catheterization 2/24/09:   mLM 40%, oLAD 70%, mLAD 80%, dLAD 50%, D1 99%, pCx 90%, mCx 95%, OM1 100% (small), OM2 99%, Laura 100% (supplied by bypass graft), mRCA 100% ISR.   Grafts: LIMA-dLAD, LIMA-D1, and SVG-RPDA patent; SVG-RI 90% s/p stent.   LV gram: mild diaphragmatic hypokinesis & mild posterobasal hypokinesis LVEF 60%, no AS  TTE 4/4/23: LVEF 54%, mild-modMR, trace AI, mod TR, mildly dilated LA, moderately dilated LV, G3DD, borderline pHTN (PASP 38)  Pharmacologic stress test 4/7/23: medium, mild-mod defect in basal anterior and basal anteroseptal walls suggestive of infarction w/ mild venus-infarct ischemia; small, mild-mod defect in mid inferolateral wall that is partially reversible suggestive of ischemia; post-stress LVEF 51%  EKG 4/2023: Sinus bradycardia, RBBB (01 May 2023 11:35)        N/V/D,  BM/ Flatus,   NGT,     palp/ sob/dyspnea/ cp,       F/C/S  Wound consult requested by team to assist w/ management of      wound/ pressure injury.   Pt (unable to)  c/o pain, drainage, odor, color change,  or worsening swelling. Offloading and pericare initiated upon admission as pt Increasingly sedentary 2/2 to illness. Pt is Incontinent of urine & stool. (+)ragsdale/ ostomy.   No h/o bites, scratches, falls, trauma.  Pt seen by Wound RN  CAV[facility] Advance/  Gloria,TRIAD/ Joselyn/ medihoney/ Allevyn foam/ dakins/ Adaptic/ DSD recommended used at home/ while awaiting consult.  Appetite good/ decreased.  weight loss.  S&S / RD consult appreciated All questions asked and answered to pt's and family's expressed understanding and satisfaction.    Current Diet: Diet, Consistent Carbohydrate/No Snacks:   DASH/TLC Sodium & Cholesterol Restricted (DASH)  No Tomatoes (05-01-23 @ 16:24)      PAST MEDICAL & SURGICAL HISTORY:  Afib      Hypertension      Diabetes mellitus      CAD (coronary artery disease)      High cholesterol      Myocardial infarct      Diabetic neuropathy      Ulcer of left lower extremity      History of right bundle branch block (RBBB)      S/P CABG x 4      S/P coronary artery stent placement          REVIEW OF SYSTEMS: Pt unable to offer  General/ Breast/ Skin/Vasc/ Neuro/ MSK: see HPI  All other systems negative    MEDICATIONS  (STANDING):  amLODIPine   Tablet 10 milliGRAM(s) Oral daily  atorvastatin 80 milliGRAM(s) Oral at bedtime  dextrose 5%. 1000 milliLiter(s) (50 mL/Hr) IV Continuous <Continuous>  dextrose 5%. 1000 milliLiter(s) (100 mL/Hr) IV Continuous <Continuous>  dextrose 50% Injectable 25 Gram(s) IV Push once  dextrose 50% Injectable 12.5 Gram(s) IV Push once  dextrose 50% Injectable 25 Gram(s) IV Push once  glucagon  Injectable 1 milliGRAM(s) IntraMuscular once  hydrALAZINE 25 milliGRAM(s) Oral two times a day  insulin glargine Injectable (LANTUS) 15 Unit(s) SubCutaneous at bedtime  insulin lispro (ADMELOG) corrective regimen sliding scale   SubCutaneous three times a day before meals  insulin lispro (ADMELOG) corrective regimen sliding scale   SubCutaneous at bedtime  insulin lispro Injectable (ADMELOG) 20 Unit(s) SubCutaneous three times a day before meals  losartan 100 milliGRAM(s) Oral daily  pantoprazole    Tablet 40 milliGRAM(s) Oral before breakfast  sodium chloride 0.9%. 1000 milliLiter(s) (50 mL/Hr) IV Continuous <Continuous>  sotalol. 60 milliGRAM(s) Oral every 12 hours    MEDICATIONS  (PRN):  dextrose Oral Gel 15 Gram(s) Oral once PRN Blood Glucose LESS THAN 70 milliGRAM(s)/deciliter      Allergies    No Known Drug Allergies  shellfish (Swelling)    Intolerances    Tomatoes (Diarrhea)  Glucophage (Diarrhea)  Cardizem (Other)      SOCIAL HISTORY:  / /single/ ; (+)HHA/ lives in SNF; Former smoker, No current/ Denies smoking, ETOH, drugs    FAMILY HISTORY:  Family history of lung cancer (Mother)     no h/o PVD or wound healing or skin/ significant problems    PHYSICAL EXAM:  Vital Signs Last 24 Hrs  T(C): 36.8 (03 May 2023 12:55), Max: 36.8 (03 May 2023 12:55)  T(F): 98.3 (03 May 2023 12:55), Max: 98.3 (03 May 2023 12:55)  HR: 66 (03 May 2023 14:10) (57 - 68)  BP: 166/76 (03 May 2023 14:10) (126/58 - 168/80)  BP(mean): 102 (03 May 2023 14:10) (77 - 107)  RR: 14 (03 May 2023 14:10) (12 - 20)  SpO2: 95% (03 May 2023 14:10) (92% - 96%)    Parameters below as of 03 May 2023 14:10  Patient On (Oxygen Delivery Method): nasal cannula  O2 Flow (L/min): 2      NAD, Guarded but stable,  A&Ox3/ Alert/ Confused  cachectic/ thin, MO/ Obese, frail,  WD/ WN/ WG,  Disheveled  Total Care Sport/ Versa Care P500 / Envella Progressa bed     HEENT:  NC/AT, PERRL, EOMI, sclera clear, mucosa moist, throat clear, trachea midline, neck supple, trach  Respiratory: nonlabored w/ equal chest rise  Gastrointestinal: soft NT/ND (+)BS  (+)PEG (+)ostomy (+)NGT  : (+)ragsdale/ purewick/ condom cath  Neurology:  weakened strength & sensation grossly intact, paraesthesia  nonverbal, no follow commands, paraplegic  Psych: calm/ appropriate/ flat affect/ easily agitated/ restless/ anxious/ difficult to assess  Musculoskeletal:  limited stiff / p/FROM, no deformities/ contractures  Vascular: BLE equally warm/ cool,  no cyanosis, clubbing, edema nor acute ischemia           >LE //BLE edema equal           BLE DP/PT pulses palpable          BLE hemosiderin staining/ varicose veins  Skin:  moist w/ good turgor  thin, dry, pale, frail,  ecchymosis w/o hematoma  blistering  or serosanguinous drainage  No odor, erythema, increased warmth, tenderness, induration, fluctuance, nor crepitus    LABS/ CULTURES/ RADIOLOGY:                        13.0   14.99 )-----------( 177      ( 02 May 2023 22:40 )             40.1       140  |  101  |  39  ----------------------------<  113      [05-02-23 @ 22:40]  3.9   |  27  |  1.46        Ca     9.3     [05-02-23 @ 22:40]      Mg     2.3     [05-02-23 @ 22:40]      PT/INR: PT 20.4 , INR 1.75       [05-02-23 @ 05:35]  PTT: 67.6       [05-03-23 @ 11:24]    A1C with Estimated Average Glucose Result: 8.2 % (05-02-23 @ 05:35)     Wound SURGERY CONSULT NOTE    HPI:62 y/o Male, former smoker, w/ PMHx of HTN, HLD, Type 2 Diabetes c/b neuropathy, CAD s/p CABG 5/2007/PCI SVG-RI 2/2009, paroxysmal AFib/flutter (s/p ablation w/ PVI; on Warfarin, last dose 4/27), known RBBB, Anxiety initially presented to outpatient cardiologist for routine cardiac evaluation. Patient reports fatigue w/ daily activities, shortness of breath/MASON, orthopnea/PND and uncontrolled blood pressure. Patient underwent TTE and nuclear stress test which were abnormal (results as below). Denies headaches, changes in vision, chest pain, palpitations, abdominal pain, N/V/D, leg pain/edema or any other complaints. In light of pt's risk factors, CCS class III anginal-equivalent symptoms and abnormal TTE/nuclear stress test; pt referred to [facility] for cardiac catheterization w/ possible intervention if clinically indicated. No implantable devices.     Review of studies:  CABG 5/11/2007  Cardiac catheterization 2/24/09:   mLM 40%, oLAD 70%, mLAD 80%, dLAD 50%, D1 99%, pCx 90%, mCx 95%, OM1 100% (small), OM2 99%, Laura 100% (supplied by bypass graft), mRCA 100% ISR.   Grafts: LIMA-dLAD, LIMA-D1, and SVG-RPDA patent; SVG-RI 90% s/p stent.   LV gram: mild diaphragmatic hypokinesis & mild posterobasal hypokinesis LVEF 60%, no AS  TTE 4/4/23: LVEF 54%, mild-modMR, trace AI, mod TR, mildly dilated LA, moderately dilated LV, G3DD, borderline pHTN (PASP 38)  Pharmacologic stress test 4/7/23: medium, mild-mod defect in basal anterior and basal anteroseptal walls suggestive of infarction w/ mild venus-infarct ischemia; small, mild-mod defect in mid inferolateral wall that is partially reversible suggestive of ischemia; post-stress LVEF 51%  EKG 4/2023: Sinus bradycardia, RBBB     Wound consult requested by team to assist w/ management of BLE wounds w/ swelling.  Pt w/o c/o pain, or redness.  (+) drainage w/o odor (+)blistering.  Pt has been wearing compression socks but they roll down.  Pt has Circaid garments but hasn't been using them as they don't stay on well.  Offloading and pericare initiated upon admission.  Pt noted spontaneous blistering, and then they drain.  No h/o bites, scratches, falls, trauma.  Pt seen by Wound team at OSH but he wants to try someplace new. Pt has been using DSD w/ compression socks.   Appetite good w/o weight loss.  All questions asked and answered to pt's expressed understanding and satisfaction.    Current Diet: Diet, Consistent Carbohydrate/No Snacks:   DASH/TLC Sodium & Cholesterol Restricted (DASH)  No Tomatoes (05-01-23 @ 16:24)      PAST MEDICAL & SURGICAL HISTORY:  Afib    Hypertension    Diabetes mellitus    CAD (coronary artery disease)    High cholesterol    Myocardial infarct    Diabetic neuropathy    Ulcer of left lower extremity     Right bundle branch block (RBBB)    S/P CABG x 4    REVIEW OF SYSTEMS: General/  Skin/Vasc/  MSK: see HPI  All other systems negative    MEDICATIONS  (STANDING):  amLODIPine   Tablet 10 milliGRAM(s) Oral daily  atorvastatin 80 milliGRAM(s) Oral at bedtime  dextrose 5%. 1000 milliLiter(s) (50 mL/Hr) IV Continuous <Continuous>  dextrose 5%. 1000 milliLiter(s) (100 mL/Hr) IV Continuous <Continuous>  dextrose 50% Injectable 25 Gram(s) IV Push once  dextrose 50% Injectable 12.5 Gram(s) IV Push once  dextrose 50% Injectable 25 Gram(s) IV Push once  glucagon  Injectable 1 milliGRAM(s) IntraMuscular once  hydrALAZINE 25 milliGRAM(s) Oral two times a day  insulin glargine Injectable (LANTUS) 15 Unit(s) SubCutaneous at bedtime  insulin lispro (ADMELOG) corrective regimen sliding scale   SubCutaneous three times a day before meals  insulin lispro (ADMELOG) corrective regimen sliding scale   SubCutaneous at bedtime  insulin lispro Injectable (ADMELOG) 20 Unit(s) SubCutaneous three times a day before meals  losartan 100 milliGRAM(s) Oral daily  pantoprazole    Tablet 40 milliGRAM(s) Oral before breakfast  sodium chloride 0.9%. 1000 milliLiter(s) (50 mL/Hr) IV Continuous <Continuous>  sotalol. 60 milliGRAM(s) Oral every 12 hours    MEDICATIONS  (PRN):  dextrose Oral Gel 15 Gram(s) Oral once PRN Blood Glucose LESS THAN 70 milliGRAM(s)/deciliter      Allergies  shellfish (Swelling)    Intolerances  Tomatoes (Diarrhea)  Glucophage (Diarrhea)  Cardizem (Other)    SOCIAL HISTORY:  ; Former smoker, social ETOH, denies other drugs    FAMILY HISTORY: lung cancer (Mother)   no h/o PVD or wound healing or skin problems    PHYSICAL EXAM:  Vital Signs Last 24 Hrs  T(C): 36.8 (03 May 2023 12:55), Max: 36.8 (03 May 2023 12:55)  T(F): 98.3 (03 May 2023 12:55), Max: 98.3 (03 May 2023 12:55)  HR: 66 (03 May 2023 14:10) (57 - 68)  BP: 166/76 (03 May 2023 14:10) (126/58 - 168/80)  BP(mean): 102 (03 May 2023 14:10) (77 - 107)  RR: 14 (03 May 2023 14:10) (12 - 20)  SpO2: 95% (03 May 2023 14:10) (92% - 96%)    Parameters below as of 03 May 2023 14:10  Patient On (Oxygen Delivery Method): nasal cannula  O2 Flow (L/min): 2    NAD,  A&Ox3, Obese  WD/ WN/ WG  Versa Care P500 bed  HEENT:  NC/AT, EOMI, sclera clear, mucosa moist, throat clear, trachea midline, neck supple  Respiratory: nonlabored w/ equal chest rise  Gastrointestinal: soft NT/ND   Neurology:  strength & sensation grossly intact, paraesthesia  Psych: calm/ appropriate  Musculoskeletal: FROM, no deformities/ contractures  Vascular: BLE equally warm,  no cyanosis, clubbing, nor acute ischemia        BLE edema equal w/ BLE DP pulses palpable          BLE hemosiderin staining/ varicose veins       Bilateral shins w/ clear blisters and superficial denuded moist skin   serosanguinous drainage  No odor, erythema, increased warmth, tenderness, induration, fluctuance, nor crepitus  Skin:  moist w/ good turgor      LABS/ CULTURES/ RADIOLOGY:                        13.0   14.99 )-----------( 177      ( 02 May 2023 22:40 )             40.1       140  |  101  |  39  ----------------------------<  113      [05-02-23 @ 22:40]  3.9   |  27  |  1.46        Ca     9.3     [05-02-23 @ 22:40]      Mg     2.3     [05-02-23 @ 22:40]      PT/INR: PT 20.4 , INR 1.75       [05-02-23 @ 05:35]  PTT: 67.6       [05-03-23 @ 11:24]    A1C with Estimated Average Glucose Result: 8.2 % (05-02-23 @ 05:35)       Wound SURGERY CONSULT NOTE    HPI:64 y/o Male, former smoker, w/ PMHx of HTN, HLD, Type 2 Diabetes c/b neuropathy, CAD s/p CABG 5/2007/PCI SVG-RI 2/2009, paroxysmal AFib/flutter (s/p ablation w/ PVI; on Warfarin, last dose 4/27), known RBBB, Anxiety initially presented to outpatient cardiologist for routine cardiac evaluation. Patient reports fatigue w/ daily activities, shortness of breath/MASON, orthopnea/PND and uncontrolled blood pressure. Patient underwent TTE and nuclear stress test which were abnormal (results as below). Denies headaches, changes in vision, chest pain, palpitations, abdominal pain, N/V/D, leg pain/edema or any other complaints. In light of pt's risk factors, CCS class III anginal-equivalent symptoms and abnormal TTE/nuclear stress test; pt referred to [facility] for cardiac catheterization w/ possible intervention if clinically indicated. No implantable devices.     Review of studies:  CABG 5/11/2007  Cardiac catheterization 2/24/09:   mLM 40%, oLAD 70%, mLAD 80%, dLAD 50%, D1 99%, pCx 90%, mCx 95%, OM1 100% (small), OM2 99%, Laura 100% (supplied by bypass graft), mRCA 100% ISR.   Grafts: LIMA-dLAD, LIMA-D1, and SVG-RPDA patent; SVG-RI 90% s/p stent.   LV gram: mild diaphragmatic hypokinesis & mild posterobasal hypokinesis LVEF 60%, no AS  TTE 4/4/23: LVEF 54%, mild-modMR, trace AI, mod TR, mildly dilated LA, moderately dilated LV, G3DD, borderline pHTN (PASP 38)  Pharmacologic stress test 4/7/23: medium, mild-mod defect in basal anterior and basal anteroseptal walls suggestive of infarction w/ mild venus-infarct ischemia; small, mild-mod defect in mid inferolateral wall that is partially reversible suggestive of ischemia; post-stress LVEF 51%  EKG 4/2023: Sinus bradycardia, RBBB     Wound consult requested by team to assist w/ management of BLE wounds w/ swelling.  Pt w/o c/o pain, or redness.  (+) drainage w/o odor (+)blistering.  Pt has been wearing compression socks but they roll down.  Pt has Circaid garments but hasn't been using them as they don't stay on well.  Offloading and pericare initiated upon admission.  Pt noted spontaneous blistering, and then they drain.  No h/o bites, scratches, falls, trauma.  Pt seen by Wound team at OSH but he wants to try someplace new. Pt has been using DSD w/ compression socks.   Appetite good w/o weight loss.  All questions asked and answered to pt's and wife's  expressed understanding and satisfaction.    Current Diet: Diet, Consistent Carbohydrate/No Snacks:   DASH/TLC Sodium & Cholesterol Restricted (DASH)  No Tomatoes (05-01-23 @ 16:24)      PAST MEDICAL & SURGICAL HISTORY:  Afib    Hypertension    Diabetes mellitus    CAD (coronary artery disease)    High cholesterol    Myocardial infarct    Diabetic neuropathy    Ulcer of left lower extremity     Right bundle branch block (RBBB)    S/P CABG x 4    REVIEW OF SYSTEMS: General/  Skin/Vasc/  MSK: see HPI  All other systems negative    MEDICATIONS  (STANDING):  amLODIPine   Tablet 10 milliGRAM(s) Oral daily  atorvastatin 80 milliGRAM(s) Oral at bedtime  dextrose 5%. 1000 milliLiter(s) (50 mL/Hr) IV Continuous <Continuous>  dextrose 5%. 1000 milliLiter(s) (100 mL/Hr) IV Continuous <Continuous>  dextrose 50% Injectable 25 Gram(s) IV Push once  dextrose 50% Injectable 12.5 Gram(s) IV Push once  dextrose 50% Injectable 25 Gram(s) IV Push once  glucagon  Injectable 1 milliGRAM(s) IntraMuscular once  hydrALAZINE 25 milliGRAM(s) Oral two times a day  insulin glargine Injectable (LANTUS) 15 Unit(s) SubCutaneous at bedtime  insulin lispro (ADMELOG) corrective regimen sliding scale   SubCutaneous three times a day before meals  insulin lispro (ADMELOG) corrective regimen sliding scale   SubCutaneous at bedtime  insulin lispro Injectable (ADMELOG) 20 Unit(s) SubCutaneous three times a day before meals  losartan 100 milliGRAM(s) Oral daily  pantoprazole    Tablet 40 milliGRAM(s) Oral before breakfast  sodium chloride 0.9%. 1000 milliLiter(s) (50 mL/Hr) IV Continuous <Continuous>  sotalol. 60 milliGRAM(s) Oral every 12 hours    MEDICATIONS  (PRN):  dextrose Oral Gel 15 Gram(s) Oral once PRN Blood Glucose LESS THAN 70 milliGRAM(s)/deciliter      Allergies  shellfish (Swelling)    Intolerances  Tomatoes (Diarrhea)  Glucophage (Diarrhea)  Cardizem (Other)    SOCIAL HISTORY:  ; Former smoker, social ETOH, denies other drugs    FAMILY HISTORY: lung cancer (Mother)   no h/o PVD or wound healing or skin problems    PHYSICAL EXAM:  Vital Signs Last 24 Hrs  T(C): 36.8 (03 May 2023 12:55), Max: 36.8 (03 May 2023 12:55)  T(F): 98.3 (03 May 2023 12:55), Max: 98.3 (03 May 2023 12:55)  HR: 66 (03 May 2023 14:10) (57 - 68)  BP: 166/76 (03 May 2023 14:10) (126/58 - 168/80)  BP(mean): 102 (03 May 2023 14:10) (77 - 107)  RR: 14 (03 May 2023 14:10) (12 - 20)  SpO2: 95% (03 May 2023 14:10) (92% - 96%)    Parameters below as of 03 May 2023 14:10  Patient On (Oxygen Delivery Method): nasal cannula  O2 Flow (L/min): 2    NAD,  A&Ox3, Obese  WD/ WN/ WG  Versa Care P500 bed  HEENT:  NC/AT, EOMI, sclera clear, mucosa moist, throat clear, trachea midline, neck supple  Respiratory: nonlabored w/ equal chest rise  Gastrointestinal: soft NT/ND   Neurology:  strength & sensation grossly intact, paraesthesia  Psych: calm/ appropriate  Musculoskeletal: FROM, no deformities/ contractures  Vascular: BLE equally warm,  no cyanosis, clubbing, nor acute ischemia        BLE edema equal w/ BLE DP non palpable pulses          BLE hemosiderin staining/ varicose veins       Bilateral shins w/ clear blisters and superficial denuded moist skin   serosanguinous drainage  No odor, erythema, increased warmth, tenderness, induration, fluctuance, nor crepitus  Skin:  moist w/ good turgor      LABS/ CULTURES/ RADIOLOGY:                        13.0   14.99 )-----------( 177      ( 02 May 2023 22:40 )             40.1       140  |  101  |  39  ----------------------------<  113      [05-02-23 @ 22:40]  3.9   |  27  |  1.46        Ca     9.3     [05-02-23 @ 22:40]      Mg     2.3     [05-02-23 @ 22:40]      PT/INR: PT 20.4 , INR 1.75       [05-02-23 @ 05:35]  PTT: 67.6       [05-03-23 @ 11:24]    A1C with Estimated Average Glucose Result: 8.2 % (05-02-23 @ 05:35)

## 2023-05-03 NOTE — PROGRESS NOTE ADULT - PROBLEM SELECTOR PLAN 4
Serial EKG  Telemetry monitoring  Dose Coumadin based on daily INR level
Serial EKG  Telemetry monitoring  Continue Heparin GTT

## 2023-05-03 NOTE — CHART NOTE - NSCHARTNOTEFT_GEN_A_CORE
Removal of Femoral Sheath    Pulses in the right/left lower extremity are palpable/audible by doppler/absent.   The patient was placed in the supine position. The insertion site was identified and the sutures were removed per protocol.    The 6____ Romansh femoral sheath was then removed.   Direct pressure was applied for  ___20___ minutes.   Complications: None, VSS, Good Hemostasis.     Monitoring of the right/left groin and both lower extremities including neuro-vascular checks and vital signs every 15 minutes x 4, then every 30 minutes x 2, then every 1 hour was ordered.    Discharge Instruction discussed with patient: ASA, Plavix/Brilinta, statin, diet, activities, access site care, follow up care, reportable signs and symptoms.     A/P     64 y/o Male, former smoker, w/ PMHx of HTN, HLD, Type 2 Diabetes c/b neuropathy, CAD s/p CABG 5/2007/PCI SVG-RI 2/2009, paroxysmal AFib/flutter (s/p ablation w/ PVI; on Warfarin, last dose 4/27), known RBBB, Anxiety initially presented to outpatient cardiologist for routine cardiac evaluation. Patient reports fatigue w/ daily activities, shortness of breath/MASON, orthopnea/PND and uncontrolled blood pressure. Patient underwent TTE and nuclear stress test which were abnormal (results as below). Denies headaches, changes in vision, chest pain, palpitations, abdominal pain, N/V/D, leg pain/edema or any other complaints. In light of pt's risk factors, CCS class III anginal-equivalent symptoms and abnormal TTE/nuclear stress test; pt referred to [facility] for cardiac catheterization w/ possible intervention if clinically indicated. No implantable devices.       Plan: continue to monitor, Continue ASA, Plavix, Statin,   Triple therapy for one week  LVEDP 35 d/w Dr Fulton Lasix 40 mg IV X 1  Crespo to BSB drained 1000 ml  Check BMP, Mg in AM

## 2023-05-04 ENCOUNTER — TRANSCRIPTION ENCOUNTER (OUTPATIENT)
Age: 63
End: 2023-05-04

## 2023-05-04 VITALS
DIASTOLIC BLOOD PRESSURE: 66 MMHG | OXYGEN SATURATION: 95 % | SYSTOLIC BLOOD PRESSURE: 146 MMHG | TEMPERATURE: 98 F | RESPIRATION RATE: 19 BRPM | HEART RATE: 55 BPM

## 2023-05-04 LAB
ANION GAP SERPL CALC-SCNC: 10 MMOL/L — SIGNIFICANT CHANGE UP (ref 5–17)
BUN SERPL-MCNC: 31 MG/DL — HIGH (ref 7–23)
CALCIUM SERPL-MCNC: 8.4 MG/DL — SIGNIFICANT CHANGE UP (ref 8.4–10.5)
CHLORIDE SERPL-SCNC: 102 MMOL/L — SIGNIFICANT CHANGE UP (ref 96–108)
CO2 SERPL-SCNC: 27 MMOL/L — SIGNIFICANT CHANGE UP (ref 22–31)
CREAT SERPL-MCNC: 1.26 MG/DL — SIGNIFICANT CHANGE UP (ref 0.5–1.3)
EGFR: 65 ML/MIN/1.73M2 — SIGNIFICANT CHANGE UP
GLUCOSE BLDC GLUCOMTR-MCNC: 109 MG/DL — HIGH (ref 70–99)
GLUCOSE BLDC GLUCOMTR-MCNC: 128 MG/DL — HIGH (ref 70–99)
GLUCOSE BLDC GLUCOMTR-MCNC: 173 MG/DL — HIGH (ref 70–99)
GLUCOSE SERPL-MCNC: 170 MG/DL — HIGH (ref 70–99)
HCT VFR BLD CALC: 33.7 % — LOW (ref 39–50)
HGB BLD-MCNC: 11.2 G/DL — LOW (ref 13–17)
MAGNESIUM SERPL-MCNC: 2.1 MG/DL — SIGNIFICANT CHANGE UP (ref 1.6–2.6)
MCHC RBC-ENTMCNC: 30.3 PG — SIGNIFICANT CHANGE UP (ref 27–34)
MCHC RBC-ENTMCNC: 33.2 GM/DL — SIGNIFICANT CHANGE UP (ref 32–36)
MCV RBC AUTO: 91.1 FL — SIGNIFICANT CHANGE UP (ref 80–100)
NRBC # BLD: 0 /100 WBCS — SIGNIFICANT CHANGE UP (ref 0–0)
PLATELET # BLD AUTO: 138 K/UL — LOW (ref 150–400)
POTASSIUM SERPL-MCNC: 3.4 MMOL/L — LOW (ref 3.5–5.3)
POTASSIUM SERPL-SCNC: 3.4 MMOL/L — LOW (ref 3.5–5.3)
RBC # BLD: 3.7 M/UL — LOW (ref 4.2–5.8)
RBC # FLD: 15 % — HIGH (ref 10.3–14.5)
SODIUM SERPL-SCNC: 139 MMOL/L — SIGNIFICANT CHANGE UP (ref 135–145)
WBC # BLD: 8.78 K/UL — SIGNIFICANT CHANGE UP (ref 3.8–10.5)
WBC # FLD AUTO: 8.78 K/UL — SIGNIFICANT CHANGE UP (ref 3.8–10.5)

## 2023-05-04 PROCEDURE — C1874: CPT

## 2023-05-04 PROCEDURE — C1887: CPT

## 2023-05-04 PROCEDURE — 93459 L HRT ART/GRFT ANGIO: CPT

## 2023-05-04 PROCEDURE — 99232 SBSQ HOSP IP/OBS MODERATE 35: CPT

## 2023-05-04 PROCEDURE — 83735 ASSAY OF MAGNESIUM: CPT

## 2023-05-04 PROCEDURE — 93005 ELECTROCARDIOGRAM TRACING: CPT

## 2023-05-04 PROCEDURE — 83036 HEMOGLOBIN GLYCOSYLATED A1C: CPT

## 2023-05-04 PROCEDURE — 36415 COLL VENOUS BLD VENIPUNCTURE: CPT

## 2023-05-04 PROCEDURE — C1725: CPT

## 2023-05-04 PROCEDURE — 92978 ENDOLUMINL IVUS OCT C 1ST: CPT | Mod: LC

## 2023-05-04 PROCEDURE — C1894: CPT

## 2023-05-04 PROCEDURE — 80048 BASIC METABOLIC PNL TOTAL CA: CPT

## 2023-05-04 PROCEDURE — C1753: CPT

## 2023-05-04 PROCEDURE — 85025 COMPLETE CBC W/AUTO DIFF WBC: CPT

## 2023-05-04 PROCEDURE — 85027 COMPLETE CBC AUTOMATED: CPT

## 2023-05-04 PROCEDURE — 85730 THROMBOPLASTIN TIME PARTIAL: CPT

## 2023-05-04 PROCEDURE — C1769: CPT

## 2023-05-04 PROCEDURE — 85610 PROTHROMBIN TIME: CPT

## 2023-05-04 PROCEDURE — 82962 GLUCOSE BLOOD TEST: CPT

## 2023-05-04 PROCEDURE — C9604: CPT | Mod: LC

## 2023-05-04 PROCEDURE — 97162 PT EVAL MOD COMPLEX 30 MIN: CPT

## 2023-05-04 RX ORDER — SEMAGLUTIDE 0.68 MG/ML
0.25 INJECTION, SOLUTION SUBCUTANEOUS
Qty: 1 | Refills: 0
Start: 2023-05-04

## 2023-05-04 RX ORDER — APIXABAN 2.5 MG/1
1 TABLET, FILM COATED ORAL
Qty: 60 | Refills: 0
Start: 2023-05-04 | End: 2023-06-02

## 2023-05-04 RX ORDER — POTASSIUM CHLORIDE 20 MEQ
40 PACKET (EA) ORAL EVERY 4 HOURS
Refills: 0 | Status: COMPLETED | OUTPATIENT
Start: 2023-05-04 | End: 2023-05-04

## 2023-05-04 RX ORDER — WARFARIN SODIUM 2.5 MG/1
1 TABLET ORAL
Refills: 0 | DISCHARGE

## 2023-05-04 RX ORDER — INSULIN LISPRO 100/ML
18 VIAL (ML) SUBCUTANEOUS
Refills: 0 | Status: DISCONTINUED | OUTPATIENT
Start: 2023-05-04 | End: 2023-05-04

## 2023-05-04 RX ORDER — ATORVASTATIN CALCIUM 80 MG/1
1 TABLET, FILM COATED ORAL
Qty: 30 | Refills: 0
Start: 2023-05-04 | End: 2023-06-02

## 2023-05-04 RX ORDER — INSULIN LISPRO 100/ML
18 VIAL (ML) SUBCUTANEOUS
Qty: 0 | Refills: 0 | DISCHARGE

## 2023-05-04 RX ORDER — FUROSEMIDE 40 MG
80 TABLET ORAL ONCE
Refills: 0 | Status: COMPLETED | OUTPATIENT
Start: 2023-05-04 | End: 2023-05-04

## 2023-05-04 RX ORDER — CLOPIDOGREL BISULFATE 75 MG/1
1 TABLET, FILM COATED ORAL
Qty: 90 | Refills: 3
Start: 2023-05-04 | End: 2024-04-27

## 2023-05-04 RX ORDER — PANTOPRAZOLE SODIUM 20 MG/1
1 TABLET, DELAYED RELEASE ORAL
Qty: 30 | Refills: 0
Start: 2023-05-04 | End: 2023-06-02

## 2023-05-04 RX ORDER — ASPIRIN/CALCIUM CARB/MAGNESIUM 324 MG
1 TABLET ORAL
Qty: 14 | Refills: 0
Start: 2023-05-04 | End: 2023-05-17

## 2023-05-04 RX ORDER — INSULIN GLARGINE 100 [IU]/ML
16 INJECTION, SOLUTION SUBCUTANEOUS
Qty: 0 | Refills: 0 | DISCHARGE
Start: 2023-05-04

## 2023-05-04 RX ORDER — BACITRACIN ZINC 500 UNIT/G
1 OINTMENT IN PACKET (EA) TOPICAL
Qty: 0 | Refills: 0 | DISCHARGE
Start: 2023-05-04

## 2023-05-04 RX ORDER — INSULIN GLARGINE 100 [IU]/ML
12 INJECTION, SOLUTION SUBCUTANEOUS
Refills: 0 | DISCHARGE

## 2023-05-04 RX ADMIN — Medication 25 MILLIGRAM(S): at 19:22

## 2023-05-04 RX ADMIN — Medication 20 UNIT(S): at 11:37

## 2023-05-04 RX ADMIN — AMLODIPINE BESYLATE 10 MILLIGRAM(S): 2.5 TABLET ORAL at 06:12

## 2023-05-04 RX ADMIN — Medication 80 MILLIGRAM(S): at 11:38

## 2023-05-04 RX ADMIN — CLOPIDOGREL BISULFATE 75 MILLIGRAM(S): 75 TABLET, FILM COATED ORAL at 06:14

## 2023-05-04 RX ADMIN — APIXABAN 5 MILLIGRAM(S): 2.5 TABLET, FILM COATED ORAL at 19:22

## 2023-05-04 RX ADMIN — Medication 60 MILLIGRAM(S): at 06:13

## 2023-05-04 RX ADMIN — Medication 20 UNIT(S): at 07:29

## 2023-05-04 RX ADMIN — LOSARTAN POTASSIUM 100 MILLIGRAM(S): 100 TABLET, FILM COATED ORAL at 06:12

## 2023-05-04 RX ADMIN — Medication 18 UNIT(S): at 16:14

## 2023-05-04 RX ADMIN — Medication 40 MILLIEQUIVALENT(S): at 06:12

## 2023-05-04 RX ADMIN — PANTOPRAZOLE SODIUM 40 MILLIGRAM(S): 20 TABLET, DELAYED RELEASE ORAL at 06:13

## 2023-05-04 RX ADMIN — Medication 1: at 07:29

## 2023-05-04 RX ADMIN — APIXABAN 5 MILLIGRAM(S): 2.5 TABLET, FILM COATED ORAL at 06:13

## 2023-05-04 RX ADMIN — Medication 81 MILLIGRAM(S): at 06:14

## 2023-05-04 RX ADMIN — Medication 40 MILLIEQUIVALENT(S): at 11:38

## 2023-05-04 RX ADMIN — Medication 60 MILLIGRAM(S): at 19:22

## 2023-05-04 RX ADMIN — Medication 25 MILLIGRAM(S): at 06:12

## 2023-05-04 NOTE — PROGRESS NOTE ADULT - ASSESSMENT
64 y/o Male, former smoker, w/ PMHx of HTN, HLD, Type 2 Diabetes c/b neuropathy, CAD s/p CABG 5/2007/PCI SVG-RI 2/2009, paroxysmal AFib/flutter (s/p ablation w/ PVI; on Warfarin, last dose 4/27), known RBBB, Anxiety initially presented for shortness of breath now s/p cath and PCI. On basal/bolus at home and restarted while inpatient. Pt would benefit from GLP-1 for CV benefit/lower insulin burden w/subsequent weight loss however would prefer to speak w/his PCP prior to starting.  BG goal (100-180mg/dl). Discussed insulin regimen, pt would benefit from balanced basal/bolus regimen as he is having some lows at home likely from insulin:carb mismatch given high premeal amount. Discussed would benefit from Endocrinology follow up as outpt.       HbA1c 8.2% ; home regimen: Lantus 12 units nightly + Humalog 20 units TID with meals  -egfr: 71

## 2023-05-04 NOTE — PROGRESS NOTE ADULT - PROBLEM SELECTOR PLAN 2
- BP goal <130/80  - Management as per primary team
- BP goal <130/80  - Management as per primary team.
Continue antihypertensive medications with hold parameters
Continue antihypertensive medications with hold parameters

## 2023-05-04 NOTE — DISCHARGE NOTE NURSING/CASE MANAGEMENT/SOCIAL WORK - PATIENT PORTAL LINK FT
You can access the FollowMyHealth Patient Portal offered by [facility] by registering at the following website: [email or web address]. By joining [facility]’s FollowMyHealth portal, you will also be able to view your health information using other applications (apps) compatible with our system.

## 2023-05-04 NOTE — PROGRESS NOTE ADULT - NUTRITIONAL ASSESSMENT
Diet, Consistent Carbohydrate/No Snacks:   DASH/TLC {Sodium & Cholesterol Restricted} (DASH)  No Tomatoes (05-01-23 @ 16:24) [Active]
[facility] INVASIVE CARDIOLOGY- (Deejay Reed, Jonathan, Mildred, Fabiana, Jodie, Hector, Gerry, Bradly)   CARDIAC [facility], [facility] TEAM   [phone]      CHIEF COMPLAINT: Patient is a 63y old  Male who presents with a chief complaint of abnormal stress test (02 May 2023 05:39)    HPI:  Cardiologist: Dr. Migue Barlow  Pharmacy: [facility] ([address] [place] [address])    Shellfish allergy - pre-medicated w/ Prednione 50mg q8hrs x3 and Benadryl on-call to lab    62 y/o Male, former smoker, w/ PMHx of HTN, HLD, Type 2 Diabetes c/b neuropathy, CAD s/p CABG 5/2007/PCI SVG-RI 2/2009, paroxysmal AFib/flutter (s/p ablation w/ PVI; on Warfarin, last dose 4/27), known RBBB, Anxiety initially presented to outpatient cardiologist for routine cardiac evaluation. Patient reports fatigue w/ daily activities, shortness of breath/MASON, orthopnea/PND and uncontrolled blood pressure. Patient underwent TTE and nuclear stress test which were abnormal (results as below). Denies headaches, changes in vision, chest pain, palpitations, abdominal pain, N/V/D, leg pain/edema or any other complaints. In light of pt's risk factors, CCS class III anginal-equivalent symptoms and abnormal TTE/nuclear stress test; pt referred to [facility] for cardiac catheterization w/ possible intervention if clinically indicated. No implantable devices.     Review of studies:  CABG 5/11/2007  Cardiac catheterization 2/24/09:   mLM 40%, oLAD 70%, mLAD 80%, dLAD 50%, D1 99%, pCx 90%, mCx 95%, OM1 100% (small), OM2 99%, Laura 100% (supplied by bypass graft), mRCA 100% ISR.   Grafts: LIMA-dLAD, LIMA-D1, and SVG-RPDA patent; SVG-RI 90% s/p stent.   LV gram: mild diaphragmatic hypokinesis & mild posterobasal hypokinesis LVEF 60%, no AS  TTE 4/4/23: LVEF 54%, mild-modMR, trace AI, mod TR, mildly dilated LA, moderately dilated LV, G3DD, borderline pHTN (PASP 38)  Pharmacologic stress test 4/7/23: medium, mild-mod defect in basal anterior and basal anteroseptal walls suggestive of infarction w/ mild venus-infarct ischemia; small, mild-mod defect in mid inferolateral wall that is partially reversible suggestive of ischemia; post-stress LVEF 51%  EKG 4/2023: Sinus bradycardia, RBBB (01 May 2023 11:35)      Subjective/Observations:   Patient feels well - no current complaints- Denies chest pain, SOB, palpitation, N/V  No pain, numbness, tingling or swelling around the access site      Review of Systems all WNL except below indicated:    Constitutional: [ ] Fever [ ] Chills [ ] Fatigue [ ] Weight change   HEENT: [ ] Blurred vision [ ] Eye Pain [ ] Headache [ ] Runny nose [ ] Sore Throat   Respiratory: [ ] Cough [ ] Wheezing [ ] Shortness of breath  Cardiovascular: [ ] Chest Pain [ ] Palpitations [ ] MASON [ ] PND [ ] Orthopnea  Gastrointestinal: [ ] Abdominal Pain [ ] Diarrhea [ ] Constipation [ ] Hemorrhoids [ ] Nausea [ ] Vomiting  Genitourinary: [ ] Nocturia [ ] Dysuria [ ] Incontinence  Extremities: [ ] Swelling [ ] Joint Pain  Neurologic: [ ] Focal deficit [ ] Paresthesias [ ] Syncope  Lymphatic: [ ] Swelling [ ] Lymphadenopathy   Skin: [ ] Rash [ ] Ecchymoses [ ] Wounds [ ] Lesions  Psychiatry: [ ] Depression [ ] Suicidal/Homicidal Ideation [ ] Anxiety [ ] Sleep Disturbances  [ ] 10 point review of systems is otherwise negative except as mentioned above            [ ]Unable to obtain    MEDICATIONS  (STANDING):      MEDICATIONS  (PRN):  dextrose Oral Gel 15 Gram(s) Oral once PRN Blood Glucose LESS THAN 70 milliGRAM(s)/deciliter      Allergies    No Known Drug Allergies  shellfish (Swelling)    Intolerances    Tomatoes (Diarrhea)  Glucophage (Diarrhea)  Cardizem (Other)      Vital Signs Last 24 Hrs  T(C): 36.6 (03 May 2023 20:55), Max: 36.8 (03 May 2023 12:55)  T(F): 97.8 (03 May 2023 20:55), Max: 98.3 (03 May 2023 12:55)  HR: 61 (03 May 2023 20:55) (61 - 74)  BP: 120/53 (03 May 2023 20:55) (120/53 - 177/76)  BP(mean): 74 (03 May 2023 20:55) (74 - 107)  RR: 16 (03 May 2023 20:55) (12 - 20)  SpO2: 96% (03 May 2023 20:55) (92% - 100%)    Parameters below as of 03 May 2023 20:55  Patient On (Oxygen Delivery Method): room air        FOCUSED PHYSICAL EXAM:  Cardiovascular: Regular, S1 and S2, No murmurs, rubs, gallops or clicks  Pulmonary: Non-labored, breath sounds are clear bilaterally, No wheezing, rales or rhonchi  cath site: Right (groin/radial) access site - stable without edema, bleeding or hematoma. soft, + pulses     LABS: All Labs Reviewed:                        13.0   14.99 )-----------( 177      ( 02 May 2023 22:40 )             40.1                         12.2   11.25 )-----------( 129      ( 01 May 2023 11:15 )             37.9     02 May 2023 22:40    140    |  101    |  39     ----------------------------<  113    3.9     |  27     |  1.46   02 May 2023 05:35    137    |  101    |  31     ----------------------------<  335    3.7     |  25     |  1.17   01 May 2023 11:15    136    |  104    |  29     ----------------------------<  390    4.2     |  20     |  1.07     Ca    9.3        02 May 2023 22:40  Ca    8.7        02 May 2023 05:35  Ca    9.0        01 May 2023 11:15  Mg     2.3       02 May 2023 22:40      PT/INR - ( 02 May 2023 05:35 )   PT: 20.4 sec;   INR: 1.75 ratio         PTT - ( 03 May 2023 11:24 )  PTT:67.6 sec          RESULTS:    TELE intepretation:     ECG:    ECHO:    STRESS TEST:    CT CORONARIES:    CATH REPORT:       -----------------------------------------------------------------------------------------------------------------------------------------------------  ( Copy paste from here below onto the " assessment " section)     HPI:  Cardiologist: Dr. Migue aBrlow  Pharmacy: [facility] ([address] [place] [address])    Shellfish allergy - pre-medicated w/ Prednione 50mg q8hrs x3 and Benadryl on-call to lab    62 y/o Male, former smoker, w/ PMHx of HTN, HLD, Type 2 Diabetes c/b neuropathy, CAD s/p CABG 5/2007/PCI SVG-RI 2/2009, paroxysmal AFib/flutter (s/p ablation w/ PVI; on Warfarin, last dose 4/27), known RBBB, Anxiety initially presented to outpatient cardiologist for routine cardiac evaluation. Patient reports fatigue w/ daily activities, shortness of breath/MASON, orthopnea/PND and uncontrolled blood pressure. Patient underwent TTE and nuclear stress test which were abnormal (results as below). Denies headaches, changes in vision, chest pain, palpitations, abdominal pain, N/V/D, leg pain/edema or any other complaints. In light of pt's risk factors, CCS class III anginal-equivalent symptoms and abnormal TTE/nuclear stress test; pt referred to [facility] for cardiac catheterization w/ possible intervention if clinically indicated. No implantable devices.     Review of studies:  CABG 5/11/2007  Cardiac catheterization 2/24/09:   mLM 40%, oLAD 70%, mLAD 80%, dLAD 50%, D1 99%, pCx 90%, mCx 95%, OM1 100% (small), OM2 99%, Laura 100% (supplied by bypass graft), mRCA 100% ISR.   Grafts: LIMA-dLAD, LIMA-D1, and SVG-RPDA patent; SVG-RI 90% s/p stent.   LV gram: mild diaphragmatic hypokinesis & mild posterobasal hypokinesis LVEF 60%, no AS  TTE 4/4/23: LVEF 54%, mild-modMR, trace AI, mod TR, mildly dilated LA, moderately dilated LV, G3DD, borderline pHTN (PASP 38)  Pharmacologic stress test 4/7/23: medium, mild-mod defect in basal anterior and basal anteroseptal walls suggestive of infarction w/ mild venus-infarct ischemia; small, mild-mod defect in mid inferolateral wall that is partially reversible suggestive of ischemia; post-stress LVEF 51%  EKG 4/2023: Sinus bradycardia, RBBB (01 May 2023 11:35)        # CAD   s/p LHC - 1 BRIANDA to     - Right radial access site is stable without swelling, bleeding, or hematoma    - Continue close monitoring of radial access site and clinical status     - Continue Aspirin and Plavix     - (Triple therapy??**)    - Continue Lipitor 80mg daily    - Continue antihypertensives ( specify BB, ACE, etc.), check flow sheet and report BP/HR trend     # HTN    - DASH diet    - Continue antihypertensive medications       - Recommends heart healthy diet which includes a variety of fruits and vegetables, whole grains, low fat dairy products, legumes and skinless poultry and fish; food prepared with little or no salt and minimize processed foods    - Avoid using NSAIDs  (Aleve, Motrin, ibuprofen, naproxen) while on DAPT, please utilize Tylenol for pain control (not to exceed 4gm in 24 hours)    - Keep K > 4 and Mg > 2    - Patient to follow up with Cards in 2 weeks post discharge      - Reviewed and reinforced with patient:  wound care instructions, activities dos and donts, medication compliance specifically antiplatelet therapy given stent/s.    - Patient aware to take DAPT  as prescribed and DO NOT STOP taking without consulting cardiologist first or STENT/s WILL CLOSE  - Reviewed and reinforced with patient:  site complications ( eg: bleeding, excruciating pain at the procedural site, large swelling (golf ball sized), extremity numbness, tingling, temperature change), or CHEST PAIN; pt aware that if any of those occur he/she must call cardiologist IMMEDIATELY or 911 or go to nearest emergency room   - Reviewed and reinforced a heart healthy diet, Smoking Cessation  - Patient verbalizes understanding of ALL OF THE ABOVE, and gives positive feedback
Diet, Consistent Carbohydrate/No Snacks:   DASH/TLC {Sodium & Cholesterol Restricted} (DASH)  No Tomatoes (05-01-23 @ 16:24) [Active]

## 2023-05-04 NOTE — PROGRESS NOTE ADULT - PROBLEM SELECTOR PLAN 1
Monitor groin site for swelling, bleeding
Monitor groin site for swelling, bleeding  Continue Heparin GTT
-test BG AC/HS  -c/w Lantus 15 units QHS  -c/w Admelog 20 units AC meals for now. If BG tightly controlled or pt not eating full meal can give 50% of dose.  -c/w Admelog low correction scale AC and low HS scale  -cons carb diet    - Discharge planning: Basal bolus + would initiate GLP-1 agonist given significant cardiac history and BMI of 41. Please check if Trulicity 0.75mg weekly or Ozempic 0.25mg weekly is covered   Pt can f/u w/PCP as outpt. If interested can f/u w/[facility] [address] [facility] [address] [place] [phone]
-test BG AC/HS  -c/w lantus 15 units QHS  -Decrease Admelog 18 units AC meals  -c/w Admelog low correction scale AC and low HS scale  -cons carb diet  Discharge planning:  Lantus 16 units QHS  Humalog 18 units TID w/meals  -Provided pt w/Ozempic coupon $0 copay for 1 month w/savings thereafter. Pt would like to speak w/his PCP and know long-term cost given financial concerns  Pt can follow up in [facility]. [address] [address] [phone] for appointment

## 2023-05-04 NOTE — PROGRESS NOTE ADULT - PROBLEM SELECTOR PLAN 3
continue statin, confirm lipid panel results
- Continue with Atorvastatin 80mg daily    Can be reached via TEAMS/pager: [phone]   office:  [phone] (M-F 9a-5pm)               [phone] (nights/weekends)   Can access Amion coverage via sunrise/tools
continue statin, confirm lipid panel results
Continue with Atorvastatin 80mg daily      discussed w/pt and team  Can be reached via TEAMS/pager: [phone]   office:  [phone] (M-F 9a-5pm)               [phone] (nights/weekends)   Can access Amion coverage via sunrise/tools.

## 2023-05-04 NOTE — PROGRESS NOTE ADULT - SUBJECTIVE AND OBJECTIVE BOX
Diabetes Follow up note:    Chief complaint: T2DM    Interval Hx: BG values 100-300s over past 24 hours. Dinner admelog held w/rebound to 300s. Pt seen at bedside. Reports feeling better overall. Discussed Ozempic coupon w/pt, pt w/concern about paying for medication long-term and would prefer to speak w/his PCP prior to starting med. On IV lasix so discharge on hold for now.     Review of Systems:  General: denies pain  GI: Tolerating POs. Denies N/V/D/Abd pain  CV: Denies CP/SOB  ENDO: No S&Sx of hypoglycemia    MEDS:  atorvastatin 80 milliGRAM(s) Oral at bedtime    insulin glargine Injectable (LANTUS) 15 Unit(s) SubCutaneous at bedtime  insulin lispro (ADMELOG) corrective regimen sliding scale   SubCutaneous three times a day before meals  insulin lispro (ADMELOG) corrective regimen sliding scale   SubCutaneous at bedtime  insulin lispro Injectable (ADMELOG) 18 Unit(s) SubCutaneous three times a day before meals      Allergies    No Known Drug Allergies  shellfish (Swelling)    Intolerances    Tomatoes (Diarrhea)  Glucophage (Diarrhea)  Cardizem (Other)    PE:  General: Male sitting in bed. NAD.   Vital Signs Last 24 Hrs  T(C): 36.8 (04 May 2023 08:16), Max: 36.8 (03 May 2023 12:55)  T(F): 98.2 (04 May 2023 08:16), Max: 98.3 (03 May 2023 12:55)  HR: 57 (04 May 2023 11:48) (55 - 74)  BP: 132/63 (04 May 2023 11:48) (118/61 - 177/76)  BP(mean): 84 (04 May 2023 11:48) (74 - 107)  RR: 19 (04 May 2023 11:48) (12 - 20)  SpO2: 96% (04 May 2023 11:48) (92% - 100%)    Parameters below as of 04 May 2023 11:48  Patient On (Oxygen Delivery Method): room air      Abd: Soft, NT,ND, Obese.   Extremities: Warm  Neuro: A&O X3    LABS:  POCT Blood Glucose.: 128 mg/dL (05-04-23 @ 10:53)  POCT Blood Glucose.: 173 mg/dL (05-04-23 @ 07:16)  POCT Blood Glucose.: 305 mg/dL (05-03-23 @ 21:24)  POCT Blood Glucose.: 190 mg/dL (05-03-23 @ 16:31)  POCT Blood Glucose.: 178 mg/dL (05-03-23 @ 11:13)  POCT Blood Glucose.: 151 mg/dL (05-03-23 @ 07:03)  POCT Blood Glucose.: 141 mg/dL (05-02-23 @ 21:01)  POCT Blood Glucose.: 211 mg/dL (05-02-23 @ 15:58)  POCT Blood Glucose.: 265 mg/dL (05-02-23 @ 12:16)  POCT Blood Glucose.: 301 mg/dL (05-02-23 @ 07:07)  POCT Blood Glucose.: 372 mg/dL (05-01-23 @ 21:09)  POCT Blood Glucose.: 317 mg/dL (05-01-23 @ 16:05)  POCT Blood Glucose.: 327 mg/dL (05-01-23 @ 14:59)                            11.2   8.78  )-----------( 138      ( 04 May 2023 05:17 )             33.7       05-04    139  |  102  |  31<H>  ----------------------------<  170<H>  3.4<L>   |  27  |  1.26    eGFR: 65    Ca    8.4      05-04  Mg     2.1     05-04      A1C with Estimated Average Glucose Result: 8.2 % (05-02-23 @ 05:35)          Contact number: claudia [phone] or [phone]      
[facility] INVASIVE CARDIOLOGY- (Deejay Reed, Jonathan, Mildred, Fabiana, Jodie, Hector, Gerry, Bradly)   CARDIAC [facility], [facility] TEAM   [phone]      CHIEF COMPLAINT: Patient is a 63y old  Male who presents with a chief complaint of abnormal stress test (02 May 2023 05:39)    HPI:  Cardiologist: Dr. Migue Barlow  Pharmacy: [facility] ([address] [place] [address])    Shellfish allergy - pre-medicated w/ Prednione 50mg q8hrs x3 and Benadryl on-call to lab    62 y/o Male, former smoker, w/ PMHx of HTN, HLD, Type 2 Diabetes c/b neuropathy, CAD s/p CABG 5/2007/PCI SVG-RI 2/2009, paroxysmal AFib/flutter (s/p ablation w/ PVI; on Warfarin, last dose 4/27), known RBBB, Anxiety initially presented to outpatient cardiologist for routine cardiac evaluation. Patient reports fatigue w/ daily activities, shortness of breath/MASON, orthopnea/PND and uncontrolled blood pressure. Patient underwent TTE and nuclear stress test which were abnormal (results as below). Denies headaches, changes in vision, chest pain, palpitations, abdominal pain, N/V/D, leg pain/edema or any other complaints. In light of pt's risk factors, CCS class III anginal-equivalent symptoms and abnormal TTE/nuclear stress test; pt referred to [facility] for cardiac catheterization w/ possible intervention if clinically indicated. No implantable devices.     Review of studies:  CABG 5/11/2007  Cardiac catheterization 2/24/09:   mLM 40%, oLAD 70%, mLAD 80%, dLAD 50%, D1 99%, pCx 90%, mCx 95%, OM1 100% (small), OM2 99%, Laura 100% (supplied by bypass graft), mRCA 100% ISR.   Grafts: LIMA-dLAD, LIMA-D1, and SVG-RPDA patent; SVG-RI 90% s/p stent.   LV gram: mild diaphragmatic hypokinesis & mild posterobasal hypokinesis LVEF 60%, no AS  TTE 4/4/23: LVEF 54%, mild-modMR, trace AI, mod TR, mildly dilated LA, moderately dilated LV, G3DD, borderline pHTN (PASP 38)  Pharmacologic stress test 4/7/23: medium, mild-mod defect in basal anterior and basal anteroseptal walls suggestive of infarction w/ mild venus-infarct ischemia; small, mild-mod defect in mid inferolateral wall that is partially reversible suggestive of ischemia; post-stress LVEF 51%  EKG 4/2023: Sinus bradycardia, RBBB (01 May 2023 11:35)      Subjective/Observations:   Reports mild discomfort at RFA cath access site; but has been ambulating without issues  Small amount of blood from penis associated with ragsdlae; ragsdale has been removed and patient was able to urinate with slight discomfort at inside of "urethra"       MEDICATIONS  (STANDING):  amLODIPine   Tablet 10 milliGRAM(s) Oral daily  apixaban 5 milliGRAM(s) Oral every 12 hours  aspirin enteric coated 81 milliGRAM(s) Oral daily  atorvastatin 80 milliGRAM(s) Oral at bedtime  bacitracin   Ointment 1 Application(s) Topical two times a day  clopidogrel Tablet 75 milliGRAM(s) Oral daily  dextrose 5%. 1000 milliLiter(s) (50 mL/Hr) IV Continuous <Continuous>  dextrose 5%. 1000 milliLiter(s) (100 mL/Hr) IV Continuous <Continuous>  dextrose 50% Injectable 25 Gram(s) IV Push once  dextrose 50% Injectable 12.5 Gram(s) IV Push once  dextrose 50% Injectable 25 Gram(s) IV Push once  glucagon  Injectable 1 milliGRAM(s) IntraMuscular once  hydrALAZINE 25 milliGRAM(s) Oral two times a day  insulin glargine Injectable (LANTUS) 15 Unit(s) SubCutaneous at bedtime  insulin lispro (ADMELOG) corrective regimen sliding scale   SubCutaneous three times a day before meals  insulin lispro (ADMELOG) corrective regimen sliding scale   SubCutaneous at bedtime  insulin lispro Injectable (ADMELOG) 20 Unit(s) SubCutaneous three times a day before meals  losartan 100 milliGRAM(s) Oral daily  pantoprazole    Tablet 40 milliGRAM(s) Oral before breakfast  sodium chloride 0.9%. 1000 milliLiter(s) (50 mL/Hr) IV Continuous <Continuous>  sotalol. 60 milliGRAM(s) Oral every 12 hours    MEDICATIONS  (PRN):  dextrose Oral Gel 15 Gram(s) Oral once PRN Blood Glucose LESS THAN 70 milliGRAM(s)/deciliter      Allergies    No Known Drug Allergies  shellfish (Swelling)    Intolerances    Tomatoes (Diarrhea)  Glucophage (Diarrhea)  Cardizem (Other)      Vital Signs Last 24 Hrs  T(C): 36.6 (03 May 2023 20:55), Max: 36.8 (03 May 2023 12:55)  T(F): 97.8 (03 May 2023 20:55), Max: 98.3 (03 May 2023 12:55)  HR: 61 (03 May 2023 20:55) (60 - 74)  BP: 120/53 (03 May 2023 20:55) (120/53 - 177/76)  BP(mean): 74 (03 May 2023 20:55) (74 - 107)  RR: 16 (03 May 2023 20:55) (12 - 20)  SpO2: 96% (03 May 2023 20:55) (92% - 100%)    Parameters below as of 03 May 2023 20:55  Patient On (Oxygen Delivery Method): room air        FOCUSED PHYSICAL EXAM:  Cardiovascular: Regular, S1 and S2, No murmurs, rubs, gallops or clicks  Pulmonary: Non-labored, breath sounds are clear bilaterally, No wheezing, rales or rhonchi  cath site: Right groin access site - stable without edema, bleeding or hematoma. soft, + pulses     LABS: All Labs Reviewed:                        13.0   14.99 )-----------( 177      ( 02 May 2023 22:40 )             40.1                         12.2   11.25 )-----------( 129      ( 01 May 2023 11:15 )             37.9     02 May 2023 22:40    140    |  101    |  39     ----------------------------<  113    3.9     |  27     |  1.46   02 May 2023 05:35    137    |  101    |  31     ----------------------------<  335    3.7     |  25     |  1.17   01 May 2023 11:15    136    |  104    |  29     ----------------------------<  390    4.2     |  20     |  1.07     Ca    9.3        02 May 2023 22:40  Ca    8.7        02 May 2023 05:35  Ca    9.0        01 May 2023 11:15  Mg     2.3       02 May 2023 22:40      PT/INR - ( 02 May 2023 05:35 )   PT: 20.4 sec;   INR: 1.75 ratio         PTT - ( 03 May 2023 11:24 )  PTT:67.6 sec      RESULTS:  CATH REPORT:    < from: Cardiac Catheterization (05.01.23 @ 13:01) >  Cath Lab Report    Diagnostic Cardiologist:       Flip Reed MD   Fellow:                        Eddie Schwartz MD   Referring Physician:           Migue Barlow MD     Procedures Performed   Procedures:               1.    Arterial Access - Right Femoral     2.    Ultrasound Guided Access   3.    Diagnostic Coronary Angiography   4.    Left Heart Cath   5.    Diagnostic Graft Angiography     Indications:               CCS Class III   Abnormal stress test     Lab Visit Indication:      new onset angina (less than or equal to 2  months)    Diagnostic Conclusions:     Severe native coronary arteries. Patent sequential graft of  LIMA-D1/mLAD and SVG-rPDA. Patent prior stent in the SVG-ramus  graft, there is a severe 90% lesion distal to the stent in distal 1/3  of the graft. LVEDP=39 mmHg. No significant LV-Ao gradient.    Recommendations:     Admit for aggressive diuresis and return for PCI of SVG-ramus graft  when euvolemic.    < end of copied text >        
[facility] - Caden Rich, Yen, Eh Rucker Savella, Goodger  Office Number:  [phone]    Patient resting comfortably in bed in NAD.  Laying flat with no respiratory distress.  No complaints of chest pain, dyspnea, palpitations, PND, or orthopnea.    ROS: negative unless otherwise mentioned.    Telemetry:  SR, brief tachy    MEDICATIONS  (STANDING):  amLODIPine   Tablet 10 milliGRAM(s) Oral daily  atorvastatin 80 milliGRAM(s) Oral at bedtime  dextrose 5%. 1000 milliLiter(s) (50 mL/Hr) IV Continuous <Continuous>  dextrose 5%. 1000 milliLiter(s) (100 mL/Hr) IV Continuous <Continuous>  dextrose 50% Injectable 25 Gram(s) IV Push once  dextrose 50% Injectable 12.5 Gram(s) IV Push once  dextrose 50% Injectable 25 Gram(s) IV Push once  glucagon  Injectable 1 milliGRAM(s) IntraMuscular once  heparin  Infusion.  Unit(s)/Hr (23 mL/Hr) IV Continuous <Continuous>  hydrALAZINE 25 milliGRAM(s) Oral two times a day  insulin glargine Injectable (LANTUS) 15 Unit(s) SubCutaneous at bedtime  insulin lispro (ADMELOG) corrective regimen sliding scale   SubCutaneous three times a day before meals  insulin lispro (ADMELOG) corrective regimen sliding scale   SubCutaneous at bedtime  insulin lispro Injectable (ADMELOG) 20 Unit(s) SubCutaneous three times a day before meals  losartan 100 milliGRAM(s) Oral daily  pantoprazole    Tablet 40 milliGRAM(s) Oral before breakfast  sodium chloride 0.9%. 1000 milliLiter(s) (50 mL/Hr) IV Continuous <Continuous>  sotalol. 60 milliGRAM(s) Oral every 12 hours    MEDICATIONS  (PRN):  dextrose Oral Gel 15 Gram(s) Oral once PRN Blood Glucose LESS THAN 70 milliGRAM(s)/deciliter  heparin   Injectable 95518 Unit(s) IV Push every 6 hours PRN For aPTT less than 40  heparin   Injectable 5000 Unit(s) IV Push every 6 hours PRN For aPTT between 40 - 57      Allergies    No Known Drug Allergies  shellfish (Swelling)    Intolerances    Tomatoes (Diarrhea)  Glucophage (Diarrhea)  Cardizem (Other)      Vital Signs Last 24 Hrs  T(C): 36.7 (03 May 2023 08:14), Max: 36.7 (02 May 2023 12:31)  T(F): 98.1 (03 May 2023 08:14), Max: 98.1 (02 May 2023 12:31)  HR: 61 (03 May 2023 08:14) (57 - 80)  BP: 138/64 (03 May 2023 08:14) (126/58 - 153/73)  BP(mean): 85 (03 May 2023 08:14) (77 - 95)  RR: 17 (03 May 2023 08:14) (16 - 18)  SpO2: 96% (03 May 2023 08:14) (93% - 97%)    Parameters below as of 03 May 2023 08:14  Patient On (Oxygen Delivery Method): room air        I&O's Summary    02 May 2023 07:01  -  03 May 2023 07:00  --------------------------------------------------------  IN: 550 mL / OUT: 2760 mL / NET: -2210 mL    03 May 2023 07:01  -  03 May 2023 11:43  --------------------------------------------------------  IN: 0 mL / OUT: 300 mL / NET: -300 mL        ON EXAM:    General: NAD, awake and alert  HEENT: Mucous membranes are moist, anicteric  Lungs: Non-labored, breath sounds are clear bilaterally, No wheezing, rales or rhonchi  Cardiovascular: S1 and S2, no murmurs, rubs, or gallops  Gastrointestinal: soft, nontender  Lymph: 1+ LE edema  Skin: No rashes  Psych:  Mood & affect appropriate    LABS: All Labs Reviewed:                        13.0   14.99 )-----------( 177      ( 02 May 2023 22:40 )             40.1                         12.2   11.25 )-----------( 129      ( 01 May 2023 11:15 )             37.9     02 May 2023 22:40    140    |  101    |  39     ----------------------------<  113    3.9     |  27     |  1.46   02 May 2023 05:35    137    |  101    |  31     ----------------------------<  335    3.7     |  25     |  1.17   01 May 2023 11:15    136    |  104    |  29     ----------------------------<  390    4.2     |  20     |  1.07     Ca    9.3        02 May 2023 22:40  Ca    8.7        02 May 2023 05:35  Ca    9.0        01 May 2023 11:15  Mg     2.3       02 May 2023 22:40      PT/INR - ( 02 May 2023 05:35 )   PT: 20.4 sec;   INR: 1.75 ratio         PTT - ( 03 May 2023 05:01 )  PTT:101.9 sec      Blood Culture:       
HPI:  Cardiologist: Dr. Migue Barlow  Pharmacy: [facility] ( [address] , [place] [address])    Shellfish allergy - pre-medicated w/ Prednione 50mg q8hrs x3 and Benadryl on-call to lab    62 y/o Male, former smoker, w/ PMHx of HTN, HLD, Type 2 Diabetes c/b neuropathy, CAD s/p CABG 2007/PCI SVG-RI 2009, paroxysmal AFib/flutter (s/p ablation w/ PVI; on Warfarin, last dose ), known RBBB, Anxiety initially presented to outpatient cardiologist for routine cardiac evaluation. Patient reports fatigue w/ daily activities, shortness of breath/MASON, orthopnea/PND and uncontrolled blood pressure. Patient underwent TTE and nuclear stress test which were abnormal (results as below). Denies headaches, changes in vision, chest pain, palpitations, abdominal pain, N/V/D, leg pain/edema or any other complaints. In light of pt's risk factors, CCS class III anginal-equivalent symptoms and abnormal TTE/nuclear stress test; pt referred to [facility] for cardiac catheterization w/ possible intervention if clinically indicated. No implantable devices.     Review of studies:  CABG 2007  Cardiac catheterization 09:   mLM 40%, oLAD 70%, mLAD 80%, dLAD 50%, D1 99%, pCx 90%, mCx 95%, OM1 100% (small), OM2 99%, Laura 100% (supplied by bypass graft), mRCA 100% ISR.   Grafts: LIMA-dLAD, LIMA-D1, and SVG-RPDA patent; SVG-RI 90% s/p stent.   LV gram: mild diaphragmatic hypokinesis & mild posterobasal hypokinesis LVEF 60%, no AS  TTE 23: LVEF 54%, mild-modMR, trace AI, mod TR, mildly dilated LA, moderately dilated LV, G3DD, borderline pHTN (PASP 38)  Pharmacologic stress test 23: medium, mild-mod defect in basal anterior and basal anteroseptal walls suggestive of infarction w/ mild venus-infarct ischemia; small, mild-mod defect in mid inferolateral wall that is partially reversible suggestive of ischemia; post-stress LVEF 51%  EKG 2023: Sinus bradycardia, RBBB (01 May 2023 11:35)          Allergies    No Known Drug Allergies  shellfish (Swelling)    Intolerances    Tomatoes (Diarrhea)  Glucophage (Diarrhea)  Cardizem (Other)      Medications:  amLODIPine   Tablet 10 milliGRAM(s) Oral daily  dextrose 5%. 1000 milliLiter(s) IV Continuous <Continuous>  dextrose 5%. 1000 milliLiter(s) IV Continuous <Continuous>  dextrose 50% Injectable 25 Gram(s) IV Push once  dextrose 50% Injectable 12.5 Gram(s) IV Push once  dextrose 50% Injectable 25 Gram(s) IV Push once  dextrose Oral Gel 15 Gram(s) Oral once PRN  glucagon  Injectable 1 milliGRAM(s) IntraMuscular once  heparin   Injectable 82637 Unit(s) IV Push every 6 hours PRN  heparin   Injectable 5000 Unit(s) IV Push every 6 hours PRN  heparin  Infusion.  Unit(s)/Hr IV Continuous <Continuous>  hydrALAZINE 25 milliGRAM(s) Oral two times a day  insulin glargine Injectable (LANTUS) 9 Unit(s) SubCutaneous at bedtime  insulin lispro (ADMELOG) corrective regimen sliding scale   SubCutaneous three times a day before meals  insulin lispro (ADMELOG) corrective regimen sliding scale   SubCutaneous at bedtime  insulin lispro Injectable (ADMELOG) 16 Unit(s) SubCutaneous three times a day before meals  losartan 100 milliGRAM(s) Oral daily  sodium chloride 0.9%. 1000 milliLiter(s) IV Continuous <Continuous>  sotalol. 60 milliGRAM(s) Oral every 12 hours      Vitals:  T(C): 36.7 (23 @ 20:35), Max: 37.1 (23 @ 10:57)  HR: 73 (23 @ 20:35) (70 - 75)  BP: 145/68 (23 @ 20:35) (131/66 - 171/87)  BP(mean): 89 (23 @ 20:35) (82 - 112)  RR: 17 (23 @ 20:35) (11 - 22)  SpO2: 98% (23 @ 20:35) (93% - 98%)  Wt(kg): --  Daily Height in cm: 177.8 (01 May 2023 11:35)    Daily Weight in k.3 (01 May 2023 11:35)  I&O's Summary    01 May 2023 07:01  -  02 May 2023 02:26  --------------------------------------------------------  IN: 0 mL / OUT: 700 mL / NET: -700 mL          Physical Exam:  Appearance: Normal  Eyes: PERRL, EOMI  HENT: Normal oral muscosa, NC/AT  Cardiovascular: S1S2, RRR, No M/R/G, no JVD, No Lower extremity edema  Procedural Access Site: No hematoma, Non-tender to palpation, 2+ pulse, No bruit, No Ecchymosis  Respiratory: Clear to auscultation bilaterally  Gastrointestinal: Soft, Non tender, Normal Bowel Sounds  Musculoskeletal: No clubbing, No joint deformity   Neurologic: Non-focal  Lymphatic: No lymphadenopathy  Psychiatry: AAOx3, Mood & affect appropriate  Skin: No rashes, No ecchymoses, No cyanosis        136  |  104  |  29<H>  ----------------------------<  390<H>  4.2   |  20<L>  |  1.07    Ca    9.0      01 May 2023 11:15      PT/INR - ( 01 May 2023 11:34 )   PT: 21.6 sec;   INR: 1.85 ratio         PTT - ( 01 May 2023 11:34 )  PTT:37.8 sec        Lipid panel   Hgb A1c                         12.2   11.25 )-----------( 129      ( 01 May 2023 11:15 )             37.9         ECG:   
[facility] - Caden Rich, Chaim Barlow Patel, Savella, Goodger  Office Number:  [phone]    Patient resting comfortably in bed in NAD.  found to have urinary obstruction  SCr now improved    ROS: negative unless otherwise mentioned.    Telemetry:  SR    MEDICATIONS  (STANDING):  amLODIPine   Tablet 10 milliGRAM(s) Oral daily  apixaban 5 milliGRAM(s) Oral every 12 hours  aspirin enteric coated 81 milliGRAM(s) Oral daily  atorvastatin 80 milliGRAM(s) Oral at bedtime  bacitracin   Ointment 1 Application(s) Topical two times a day  clopidogrel Tablet 75 milliGRAM(s) Oral daily  dextrose 5%. 1000 milliLiter(s) (50 mL/Hr) IV Continuous <Continuous>  dextrose 5%. 1000 milliLiter(s) (100 mL/Hr) IV Continuous <Continuous>  dextrose 50% Injectable 25 Gram(s) IV Push once  dextrose 50% Injectable 12.5 Gram(s) IV Push once  dextrose 50% Injectable 25 Gram(s) IV Push once  furosemide   Injectable 80 milliGRAM(s) IV Push once  glucagon  Injectable 1 milliGRAM(s) IntraMuscular once  hydrALAZINE 25 milliGRAM(s) Oral two times a day  insulin glargine Injectable (LANTUS) 15 Unit(s) SubCutaneous at bedtime  insulin lispro (ADMELOG) corrective regimen sliding scale   SubCutaneous three times a day before meals  insulin lispro (ADMELOG) corrective regimen sliding scale   SubCutaneous at bedtime  insulin lispro Injectable (ADMELOG) 20 Unit(s) SubCutaneous three times a day before meals  losartan 100 milliGRAM(s) Oral daily  pantoprazole    Tablet 40 milliGRAM(s) Oral before breakfast  potassium chloride    Tablet ER 40 milliEquivalent(s) Oral every 4 hours  sodium chloride 0.9%. 1000 milliLiter(s) (50 mL/Hr) IV Continuous <Continuous>  sotalol. 60 milliGRAM(s) Oral every 12 hours    MEDICATIONS  (PRN):  dextrose Oral Gel 15 Gram(s) Oral once PRN Blood Glucose LESS THAN 70 milliGRAM(s)/deciliter      Allergies    No Known Drug Allergies  shellfish (Swelling)    Intolerances    Tomatoes (Diarrhea)  Glucophage (Diarrhea)  Cardizem (Other)      Vital Signs Last 24 Hrs  T(C): 36.8 (04 May 2023 08:16), Max: 36.8 (03 May 2023 12:55)  T(F): 98.2 (04 May 2023 08:16), Max: 98.3 (03 May 2023 12:55)  HR: 55 (04 May 2023 08:16) (55 - 74)  BP: 118/61 (04 May 2023 08:16) (118/61 - 177/76)  BP(mean): 79 (04 May 2023 08:16) (74 - 107)  RR: 17 (04 May 2023 08:16) (12 - 20)  SpO2: 95% (04 May 2023 08:16) (92% - 100%)    Parameters below as of 04 May 2023 08:16  Patient On (Oxygen Delivery Method): room air        I&O's Summary    03 May 2023 07:01  -  04 May 2023 07:00  --------------------------------------------------------  IN: 300 mL / OUT: 4350 mL / NET: -4050 mL    04 May 2023 07:01  -  04 May 2023 11:33  --------------------------------------------------------  IN: 240 mL / OUT: 350 mL / NET: -110 mL        ON EXAM:  General: NAD, awake and alert  HEENT: Mucous membranes are moist, anicteric  Lungs: Non-labored, breath sounds are clear bilaterally, No wheezing, rales or rhonchi  Cardiovascular: S1 and S2, no murmurs, rubs, or gallops  Gastrointestinal: soft, nontender  Lymph: 1+ LE edema  Skin: No rashes  Psych:  Mood & affect appropriate    LABS: All Labs Reviewed:                        11.2   8.78  )-----------( 138      ( 04 May 2023 05:17 )             33.7                         13.0   14.99 )-----------( 177      ( 02 May 2023 22:40 )             40.1     04 May 2023 05:20    139    |  102    |  31     ----------------------------<  170    3.4     |  27     |  1.26   02 May 2023 22:40    140    |  101    |  39     ----------------------------<  113    3.9     |  27     |  1.46   02 May 2023 05:35    137    |  101    |  31     ----------------------------<  335    3.7     |  25     |  1.17     Ca    8.4        04 May 2023 05:20  Ca    9.3        02 May 2023 22:40  Ca    8.7        02 May 2023 05:35  Mg     2.1       04 May 2023 05:20  Mg     2.3       02 May 2023 22:40      PTT - ( 03 May 2023 11:24 )  PTT:67.6 sec
HPI:  Cardiologist: Dr. Migue Barlow  Pharmacy: [facility] ( [place] , [place] [address])    Shellfish allergy - pre-medicated w/ Prednione 50mg q8hrs x3 and Benadryl on-call to lab    62 y/o Male, former smoker, w/ PMHx of HTN, HLD, Type 2 Diabetes c/b neuropathy, CAD s/p CABG 2007/PCI SVG-RI 2009, paroxysmal AFib/flutter (s/p ablation w/ PVI; on Warfarin, last dose ), known RBBB, Anxiety initially presented to outpatient cardiologist for routine cardiac evaluation. Patient reports fatigue w/ daily activities, shortness of breath/MASON, orthopnea/PND and uncontrolled blood pressure. Patient underwent TTE and nuclear stress test which were abnormal (results as below). Denies headaches, changes in vision, chest pain, palpitations, abdominal pain, N/V/D, leg pain/edema or any other complaints. In light of pt's risk factors, CCS class III anginal-equivalent symptoms and abnormal TTE/nuclear stress test; pt referred to [facility] for cardiac catheterization w/ possible intervention if clinically indicated. No implantable devices.     Review of studies:  CABG 2007  Cardiac catheterization 09:   mLM 40%, oLAD 70%, mLAD 80%, dLAD 50%, D1 99%, pCx 90%, mCx 95%, OM1 100% (small), OM2 99%, Laura 100% (supplied by bypass graft), mRCA 100% ISR.   Grafts: LIMA-dLAD, LIMA-D1, and SVG-RPDA patent; SVG-RI 90% s/p stent.   LV gram: mild diaphragmatic hypokinesis & mild posterobasal hypokinesis LVEF 60%, no AS  TTE 23: LVEF 54%, mild-modMR, trace AI, mod TR, mildly dilated LA, moderately dilated LV, G3DD, borderline pHTN (PASP 38)  Pharmacologic stress test 23: medium, mild-mod defect in basal anterior and basal anteroseptal walls suggestive of infarction w/ mild venus-infarct ischemia; small, mild-mod defect in mid inferolateral wall that is partially reversible suggestive of ischemia; post-stress LVEF 51%  EKG 2023: Sinus bradycardia, RBBB (01 May 2023 11:35)    Patient is a 63y old  Male who presents with a chief complaint of abnormal stress test (02 May 2023 05:39)          Allergies    No Known Drug Allergies  shellfish (Swelling)    Intolerances    Tomatoes (Diarrhea)  Glucophage (Diarrhea)  Cardizem (Other)      Medications:  amLODIPine   Tablet 10 milliGRAM(s) Oral daily  atorvastatin 80 milliGRAM(s) Oral at bedtime  dextrose 5%. 1000 milliLiter(s) IV Continuous <Continuous>  dextrose 5%. 1000 milliLiter(s) IV Continuous <Continuous>  dextrose 50% Injectable 25 Gram(s) IV Push once  dextrose 50% Injectable 12.5 Gram(s) IV Push once  dextrose 50% Injectable 25 Gram(s) IV Push once  dextrose Oral Gel 15 Gram(s) Oral once PRN  glucagon  Injectable 1 milliGRAM(s) IntraMuscular once  heparin   Injectable 02096 Unit(s) IV Push every 6 hours PRN  heparin   Injectable 5000 Unit(s) IV Push every 6 hours PRN  heparin  Infusion.  Unit(s)/Hr IV Continuous <Continuous>  hydrALAZINE 25 milliGRAM(s) Oral two times a day  insulin glargine Injectable (LANTUS) 15 Unit(s) SubCutaneous at bedtime  insulin lispro (ADMELOG) corrective regimen sliding scale   SubCutaneous three times a day before meals  insulin lispro (ADMELOG) corrective regimen sliding scale   SubCutaneous at bedtime  insulin lispro Injectable (ADMELOG) 20 Unit(s) SubCutaneous three times a day before meals  losartan 100 milliGRAM(s) Oral daily  sodium chloride 0.9%. 1000 milliLiter(s) IV Continuous <Continuous>  sotalol. 60 milliGRAM(s) Oral every 12 hours      Vitals:  T(C): 36.7 (23 @ 20:51), Max: 36.7 (23 @ 07:56)  HR: 57 (23 @ 20:51) (57 - 80)  BP: 126/58 (23 @ 20:51) (126/58 - 160/79)  BP(mean): 77 (23 @ 20:51) (77 - 102)  RR: 18 (23 @ 20:51) (16 - 18)  SpO2: 95% (23 @ 20:51) (93% - 98%)  Wt(kg): --  Daily     Daily Weight in k.8 (02 May 2023 09:40)  I&O's Summary    01 May 2023 07:01  -  02 May 2023 07:00  --------------------------------------------------------  IN: 484 mL / OUT: 1550 mL / NET: -1066 mL    02 May 2023 07:01  -  03 May 2023 02:20  --------------------------------------------------------  IN: 550 mL / OUT: 1740 mL / NET: -1190 mL          Physical Exam:  Appearance: Normal  Eyes: PERRL, EOMI  HENT: Normal oral mus[place], NC/AT  Cardiovascular: S1S2, RRR, No M/R/G, no JVD, No Lower extremity edema  Procedural Access Site: No hematoma, Non-tender to palpation, 2+ pulse, No bruit, No Ecchymosis  Respiratory: Clear to auscultation bilaterally  Gastrointestinal: Soft, Non tender, Normal Bowel Sounds  Musculoskeletal: No clubbing, No joint deformity   Neurologic: Non-focal  Lymphatic: No lymphadenopathy  Psychiatry: AAOx3, Mood & affect appropriate  Skin: No rashes, No ecchymoses, No cyanosis        140  |  101  |  39<H>  ----------------------------<  113<H>  3.9   |  27  |  1.46<H>    Ca    9.3      02 May 2023 22:40  Mg     2.3     02      PT/INR - ( 02 May 2023 05:35 )   PT: 20.4 sec;   INR: 1.75 ratio         PTT - ( 02 May 2023 22:40 )  PTT:96.5 sec        Lipid panel   Hgb A1c                         13.0   14.99 )-----------( 177      ( 02 May 2023 22:40 )             40.1         ECG: SB 59 bpm
Diabetes Follow up note:    Chief complaint: T2DM    Interval Hx: s/p PCI today. Pt seen at bedside in [facility]. Pt ate breakfast this AM but not lunch. Lying flat after cath w/complaint of urinary retention and SOB. DM managed outpt by PCP. Is on basal/bolus insulin at home.     Review of Systems:  General: as above  GI: Tolerating POs. Denies N/V/D/Abd pain  CV: Denies CP/SOB  ENDO: No S&Sx of hypoglycemia    MEDS:  atorvastatin 80 milliGRAM(s) Oral at bedtime  insulin glargine Injectable (LANTUS) 15 Unit(s) SubCutaneous at bedtime  insulin lispro (ADMELOG) corrective regimen sliding scale   SubCutaneous three times a day before meals  insulin lispro (ADMELOG) corrective regimen sliding scale   SubCutaneous at bedtime  insulin lispro Injectable (ADMELOG) 20 Unit(s) SubCutaneous three times a day before meals      Allergies    No Known Drug Allergies  shellfish (Swelling)    Intolerances    Tomatoes (Diarrhea)  Glucophage (Diarrhea)  Cardizem (Other)    PE:  General: Male lying in bed. NAD.   Vital Signs Last 24 Hrs  T(C): 36.8 (03 May 2023 12:55), Max: 36.8 (03 May 2023 12:55)  T(F): 98.3 (03 May 2023 12:55), Max: 98.3 (03 May 2023 12:55)  HR: 66 (03 May 2023 14:10) (57 - 68)  BP: 166/76 (03 May 2023 14:10) (126/58 - 168/80)  BP(mean): 102 (03 May 2023 14:10) (77 - 107)  RR: 14 (03 May 2023 14:10) (12 - 20)  SpO2: 95% (03 May 2023 14:10) (92% - 96%)    Parameters below as of 03 May 2023 14:10  Patient On (Oxygen Delivery Method): nasal cannula  O2 Flow (L/min): 2    Abd: Soft, NT,ND, Obese  Extremities: Warm. B/L LE wrap in place.   Neuro: A&O X3    LABS:  POCT Blood Glucose.: 178 mg/dL (05-03-23 @ 11:13)  POCT Blood Glucose.: 151 mg/dL (05-03-23 @ 07:03)  POCT Blood Glucose.: 141 mg/dL (05-02-23 @ 21:01)  POCT Blood Glucose.: 211 mg/dL (05-02-23 @ 15:58)  POCT Blood Glucose.: 265 mg/dL (05-02-23 @ 12:16)  POCT Blood Glucose.: 301 mg/dL (05-02-23 @ 07:07)  POCT Blood Glucose.: 372 mg/dL (05-01-23 @ 21:09)  POCT Blood Glucose.: 317 mg/dL (05-01-23 @ 16:05)  POCT Blood Glucose.: 327 mg/dL (05-01-23 @ 14:59)  POCT Blood Glucose.: 359 mg/dL (05-01-23 @ 11:02)                            13.0   14.99 )-----------( 177      ( 02 May 2023 22:40 )             40.1       05-02    140  |  101  |  39<H>  ----------------------------<  113<H>  3.9   |  27  |  1.46<H>    eGFR: 54<L>    Ca    9.3      05-02  Mg     2.3     05-02       A1C with Estimated Average Glucose Result: 8.2 % (05-02-23 @ 05:35)          Contact number: claudia [phone] or [phone]

## 2023-05-04 NOTE — CHART NOTE - NSCHARTNOTEFT_GEN_A_CORE
Discussed with Dr. Fulton, pt received Lasix 80mg IV x1 earlier and should be discharged home on Lasix 40mg PO daily. Endocrine discharge medication recommendations implemented. Ozempic covered by insurance (prior auth done) copay is $24.99. Eliquis approved by insurance, copay is $40. Pt to be on triple therapy (ASA, Plavix, Eliquis) x 2weeks, then just Plavix and Eliquis thereafter. Discussed with patient at length regarding importance of medication compliance.  Discussed with Dr. Reed - pt stable for discharge home, pt with no complaints, discharge medications reviewed with MD.

## 2023-05-04 NOTE — PROGRESS NOTE ADULT - ASSESSMENT
Thierno is a 62 y/o Male, former smoker, w/ PMHx of HTN, HLD, Type 2 Diabetes c/b neuropathy, CAD s/p CABG 5/2007/PCI SVG-RI 2/2009, paroxysmal AFib/flutter (s/p ablation w/ PVI; on Warfarin, last dose 4/27), known RBBB, who presented with worsening dyspnea on exertion. Pharm stress test with inferolateral ischemia, and he is now s/p cath.    - s/p cath with severe native disease  - 90% lesion in the SVG to ramus distal to prior stent  - asa 81  - lvedp elevated at 39  - continues to have chronic jennie edema  - s/p IV lasix, mild SCr increase likely diuretic effect  - echo in office with ef 54%  - monitor i/o's and weights  - trend creatinine and electrolytes. Keep K>4, mg>2    - history of paf, in sr  - cont apixaban for AC    - cont losartan, norvasc, sotalol and hydralazine, which can be titrated up as needed  s/p PCI  off supplemental O2  80mg IV lasix today. if continues to feel better anticipate possible discharge today    - will follow with you

## 2023-05-04 NOTE — PROGRESS NOTE ADULT - PROBLEM SELECTOR PROBLEM 1
Type 2 diabetes mellitus with hyperglycemia
Type 2 diabetes mellitus with hyperglycemia
CAD (coronary artery disease)
CAD (coronary artery disease)

## 2023-05-04 NOTE — PROGRESS NOTE ADULT - ASSESSMENT
64 y/o Male, former smoker, w/ PMHx of HTN, HLD, Type 2 Diabetes c/b neuropathy, CAD s/p CABG 5/2007/PCI SVG-RI 2/2009, paroxysmal AFib/flutter (s/p ablation w/ PVI; on Warfarin, last dose 4/27), known RBBB, Anxiety is now s/p LHC with stent placement and currently in the process of volume optimization with IV diuretics.     # CAD   s/p diagnostic LHC on 5/1/23 via RFA with LVEDP of 39mmHg   s/p staged PCI - 1 BRIANDA to SVG to OM1 via RFA with Dr. Reed on 5/3/23      - Right femoral access site is stable without swelling, bleeding, or hematoma    - Continue close monitoring of femoral access site and clinical status     - Resume Eliquis in AM and plan for triple therapy for 2 weeks. Continue Eliquis, Aspirin, and Plavix for 2 weeks and then drop Aspirin on 5/17/23    - Continue Lipitor 80mg daily    # HTN    - DASH diet    - Continue antihypertensive medications     # CHF     - Volume overloaded with evidence of high LVEDP of 39mmgHg on 5/1/23 - improving on IV Lasix     - ACE wrapped bilateral LE to help with LE edema    - Gen Cards recs appreciated - continue daily assessment of IV diuretics dose and renal function     - Strict I&O, DASH diet     # DM    - Endo recs appreciated    - Currently in the process of getting prior-auth     - Consistent carb diet, serial FS monitoring with Insulin regimen      - Recommends heart healthy diet which includes a variety of fruits and vegetables, whole grains, low fat dairy products, legumes and skinless poultry and fish; food prepared with little or no salt and minimize processed foods    - Avoid using NSAIDs  (Aleve, Motrin, ibuprofen, naproxen) while on DAPT, please utilize Tylenol for pain control (not to exceed 4gm in 24 hours)    - Keep K > 4 and Mg > 2    - Patient to follow up with Cards in 2 weeks post discharge    - Reviewed and reinforced with patient:  wound care instructions, activities dos and donts, medication compliance specifically antiplatelet therapy given stent/s.    - Patient aware to take DAPT  as prescribed and DO NOT STOP taking without consulting cardiologist first or STENT/s WILL CLOSE  - Reviewed and reinforced with patient:  site complications ( eg: bleeding, excruciating pain at the procedural site, large swelling (golf ball sized), extremity numbness, tingling, temperature change), or CHEST PAIN; pt aware that if any of those occur he/she must call cardiologist IMMEDIATELY or 911 or go to nearest emergency room   - Reviewed and reinforced a heart healthy diet, Smoking Cessation  - Patient verbalizes understanding of ALL OF THE ABOVE, and gives positive feedback      62 y/o Male, former smoker, w/ PMHx of HTN, HLD, Type 2 Diabetes c/b neuropathy, CAD s/p CABG 5/2007/PCI SVG-RI 2/2009, paroxysmal AFib/flutter (s/p ablation w/ PVI; on Warfarin, last dose 4/27), known RBBB, Anxiety is now s/p LHC with stent placement and currently in the process of volume optimization with IV diuretics.     # CAD   s/p diagnostic LHC on 5/1/23 via RFA with LVEDP of 39mmHg   s/p staged PCI - 1 BRIANDA to SVG to OM1 via RFA with Dr. Reed on 5/3/23      - Right femoral access site is stable without swelling, bleeding, or hematoma    - Continue close monitoring of femoral access site and clinical status     - Resume Eliquis in AM and plan for triple therapy for 2 weeks. Continue Eliquis, Aspirin, and Plavix for 2 weeks and then drop Aspirin on 5/17/23    - Continue Lipitor 80mg daily    # HTN    - DASH diet    - Continue Amlodipine 10mg daily, Hydralazine 25mg BID, Losartan 100mg daily     # CHF     - Volume overloaded with evidence of high LVEDP of 39mmgHg on 5/1/23 - improving on IV Lasix     - ACE wrapped bilateral LE to help with LE edema    - Gen Cards recs appreciated - continue daily assessment of IV diuretics dose and renal function     - Strict I&O, DASH diet     # DM    - Endo recs appreciated    - Currently in the process of getting prior-auth     - Consistent carb diet, serial FS monitoring with Insulin regimen    # Afib     - Resume Eliquis in AM    - Continue Sotalol BID         - Recommends heart healthy diet which includes a variety of fruits and vegetables, whole grains, low fat dairy products, legumes and skinless poultry and fish; food prepared with little or no salt and minimize processed foods    - Avoid using NSAIDs  (Aleve, Motrin, ibuprofen, naproxen) while on DAPT, please utilize Tylenol for pain control (not to exceed 4gm in 24 hours)    - Keep K > 4 and Mg > 2    - Patient to follow up with Cards in 2 weeks post discharge    - Reviewed and reinforced with patient:  wound care instructions, activities dos and donts, medication compliance specifically antiplatelet therapy given stent/s.    - Patient aware to take DAPT  as prescribed and DO NOT STOP taking without consulting cardiologist first or STENT/s WILL CLOSE  - Reviewed and reinforced with patient:  site complications ( eg: bleeding, excruciating pain at the procedural site, large swelling (golf ball sized), extremity numbness, tingling, temperature change), or CHEST PAIN; pt aware that if any of those occur he/she must call cardiologist IMMEDIATELY or 911 or go to nearest emergency room   - Reviewed and reinforced a heart healthy diet, Smoking Cessation  - Patient verbalizes understanding of ALL OF THE ABOVE, and gives positive feedback

## 2023-05-04 NOTE — DISCHARGE NOTE NURSING/CASE MANAGEMENT/SOCIAL WORK - NSDCPEFALRISK_GEN_ALL_CORE
For information on Fall & Injury Prevention, visit: [email or web address] OR  [email or web address] OR  https://www.cdc.gov/steadi/patient.html

## 2023-05-05 RX ORDER — FUROSEMIDE 40 MG
1 TABLET ORAL
Qty: 30 | Refills: 0
Start: 2023-05-05 | End: 2023-06-03

## 2023-05-09 ENCOUNTER — NON-APPOINTMENT (OUTPATIENT)
Age: 63
End: 2023-05-09

## 2023-05-09 PROBLEM — Z86.79 PERSONAL HISTORY OF OTHER DISEASES OF THE CIRCULATORY SYSTEM: Chronic | Status: ACTIVE | Noted: 2023-05-01

## 2023-05-16 ENCOUNTER — NON-APPOINTMENT (OUTPATIENT)
Age: 63
End: 2023-05-16

## 2023-05-16 ENCOUNTER — APPOINTMENT (OUTPATIENT)
Dept: CARDIOLOGY | Facility: CLINIC | Age: 63
End: 2023-05-16
Payer: COMMERCIAL

## 2023-05-16 VITALS
HEIGHT: 70 IN | DIASTOLIC BLOOD PRESSURE: 68 MMHG | SYSTOLIC BLOOD PRESSURE: 140 MMHG | HEART RATE: 66 BPM | OXYGEN SATURATION: 98 %

## 2023-05-16 PROCEDURE — 93000 ELECTROCARDIOGRAM COMPLETE: CPT

## 2023-05-25 RX ORDER — APIXABAN 2.5 MG/1
1 TABLET, FILM COATED ORAL
Qty: 180 | Refills: 0
Start: 2023-05-25 | End: 2023-08-22

## 2023-05-29 RX ORDER — SEMAGLUTIDE 0.68 MG/ML
0.5 INJECTION, SOLUTION SUBCUTANEOUS
Qty: 4 | Refills: 0
Start: 2023-05-29 | End: 2023-08-26

## 2023-06-06 ENCOUNTER — NON-APPOINTMENT (OUTPATIENT)
Age: 63
End: 2023-06-06

## 2023-06-06 ENCOUNTER — APPOINTMENT (OUTPATIENT)
Dept: CARDIOLOGY | Facility: CLINIC | Age: 63
End: 2023-06-06
Payer: COMMERCIAL

## 2023-06-06 VITALS
HEART RATE: 58 BPM | WEIGHT: 270 LBS | BODY MASS INDEX: 38.65 KG/M2 | HEIGHT: 70 IN | OXYGEN SATURATION: 98 % | DIASTOLIC BLOOD PRESSURE: 88 MMHG | SYSTOLIC BLOOD PRESSURE: 174 MMHG

## 2023-06-06 PROCEDURE — 99214 OFFICE O/P EST MOD 30 MIN: CPT | Mod: 25

## 2023-06-06 PROCEDURE — 93000 ELECTROCARDIOGRAM COMPLETE: CPT

## 2023-06-06 NOTE — HISTORY OF PRESENT ILLNESS
[FreeTextEntry1] : Thierno Hassan presented to the office today for a followup evaluation. He was last seen in the office 6 weeks ago.\par \par He is now 63 years old, with a history of multiple medical problems. He has a history of coronary artery disease, status post PCI as well as coronary bypass. His coronary bypass was in 2007, and he has had PCI in 2009, with a bare-metal stents placed to the saphenous vein graft to his ramus intermedius. He has a history of paroxysmal atrial fibrillation, generally maintained in sinus rhythm on warfarin and sotalol. In 2016, he had increasingly frequent episodes of atrial fibrillation, but did not stabilize. He continued to have an intermittently tachycardic ventricular response despite the addition of diltiazem, which he did not like very much. Eventually, he was admitted to the hospital, and was referred to [facility] where he had pulmonary vein isolation.\par \par Because of recurrent tachycardia, he was started on diltiazem. Over time, he developed atrial flutter. This was relatively frequent initially after his ablation, but had seemingly become less frequent. He had symptoms suggesting tachy lara syndrome, but he never went to electrophysiology. This went away on its own, and he now refuses to take diltiazem because he feels that this caused his issues overall.\par \par He was evaluated March 29, 2023 reporting symptoms of significant fatigue with activity and uncontrolled blood pressure.  His symptoms were suspicious for angina, which had been a suspicion we had had for some time, but had not addressed due to his fears and poor follow-up.  His blood pressure was elevated at least in part due to his discontinuing amlodipine on his own.  I resumed amlodipine, given nitroglycerin to use as needed, and I recommended several examinations, including an echocardiogram and nuclear stress testing.\par \par Echocardiography was performed April 7, 2023.  This revealed a normal ejection fraction of 54%, with mild to moderate mitral regurgitation.  Pharmacologic nuclear stress testing revealed a medium sized mild to moderate defect in the basal anterior and anteroseptal walls, as well as a small sized mild to moderate defect in the mid inferolateral wall.  The stress ejection fraction was 51%.  Given his symptoms and these findings, I recommended that we discuss things in the office.  I saw him in the office, and we agreed to pursue cardiac catheterization.  Catheterization was performed May 1, 2023.  This revealed severe disease of the native coronary arteries.  There was a patient sequential graft of the LIMA touching down to the first diagonal in the mid LAD, as well as a patent SVG to the right PDA.  There was a patent prior stent in the SVG to the ramus.  There was a severe, 90% lesion distal to the stent in the distal third of the vein graft.  Filling pressures were elevated, and he was diuresed.  2 days later on May 3, 2023 he had successful IVUS guided drug-eluting stent placement to the distal third of the SVG to the ramus.  His LVEDP was noted to still be elevated at 35 mmHg at that time.  He was discharged on triple therapy, including aspirin, Plavix and Eliquis, for 2 weeks, and then was expected to be on Plavix and Eliquis.\par \par He presents to the office today with concerns.  His stamina is a little bit better in terms of dyspnea.  He walked, and developed burning in his legs, rather than shortness of breath, at a longer distance than what he was able to do before.  He remains with a significant lack of energy, and remains fatigued.  He has not been taking amlodipine.  He has not been taking atorvastatin.  He has not been taking Ozempic.  He has been taking hydralazine twice daily, most recently this morning at 9 AM.  He has been compliant with anticoagulants.

## 2023-06-06 NOTE — PHYSICAL EXAM
[Well Developed] : well developed [Well Nourished] : well nourished [No Acute Distress] : no acute distress [Normal Venous Pressure] : normal venous pressure [No Carotid Bruit] : no carotid bruit [Normal S1, S2] : normal S1, S2 [No Rub] : no rub [No Pitting Edema] : no pitting edema present [No Abnormalities] : the abdominal aorta was not enlarged and no bruit was heard [Clear Lung Fields] : clear lung fields [Good Air Entry] : good air entry [No Respiratory Distress] : no respiratory distress  [Soft] : abdomen soft [Non Tender] : non-tender [No Masses/organomegaly] : no masses/organomegaly [Normal Bowel Sounds] : normal bowel sounds [Normal Gait] : normal gait [No Edema] : no edema [No Cyanosis] : no cyanosis [No Clubbing] : no clubbing [No Varicosities] : no varicosities [No Rash] : no rash [No Skin Lesions] : no skin lesions [Moves all extremities] : moves all extremities [No Focal Deficits] : no focal deficits [Normal Speech] : normal speech [Alert and Oriented] : alert and oriented [Normal memory] : normal memory [General Appearance - Well Developed] : well developed [Normal Appearance] : normal appearance [Well Groomed] : well groomed [General Appearance - Well Nourished] : well nourished [No Deformities] : no deformities [General Appearance - In No Acute Distress] : no acute distress [Normal Conjunctiva] : the conjunctiva exhibited no abnormalities [Eyelids - No Xanthelasma] : the eyelids demonstrated no xanthelasmas [Normal Oral Mucosa] : normal oral mucosa [No Oral Pallor] : no oral pallor [No Oral Cyanosis] : no oral cyanosis [Normal Jugular Venous A Waves Present] : normal jugular venous A waves present [Normal Jugular Venous V Waves Present] : normal jugular venous V waves present [No Jugular Venous Rose A Waves] : no jugular venous rose A waves [Respiration, Rhythm And Depth] : normal respiratory rhythm and effort [Exaggerated Use Of Accessory Muscles For Inspiration] : no accessory muscle use [Auscultation Breath Sounds / Voice Sounds] : lungs were clear to auscultation bilaterally [Abdomen Soft] : soft [Abdomen Tenderness] : non-tender [Abdomen Mass (___ Cm)] : no abdominal mass palpated [Abnormal Walk] : normal gait [Gait - Sufficient For Exercise Testing] : the gait was sufficient for exercise testing [Nail Clubbing] : no clubbing of the fingernails [Cyanosis, Localized] : no localized cyanosis [Petechial Hemorrhages (___cm)] : no petechial hemorrhages [Skin Color & Pigmentation] : normal skin color and pigmentation [] : no rash [No Venous Stasis] : no venous stasis [Skin Lesions] : no skin lesions [No Skin Ulcers] : no skin ulcer [No Xanthoma] : no  xanthoma was observed [Oriented To Time, Place, And Person] : oriented to person, place, and time [Affect] : the affect was normal [Mood] : the mood was normal [No Anxiety] : not feeling anxious [Normal] : normal [Normal Rate] : normal [Rhythm Regular] : regular [Normal S1] : normal S1 [Normal S2] : normal S2 [No Gallop] : no gallop heard [No Murmur] : no murmurs heard [2+] : left 2+ [___ +] : bilateral [unfilled]U+ pretibial pitting edema [FreeTextEntry1] : Obese [S3] : no S3 [S4] : no S4 [Right Carotid Bruit] : no bruit heard over the right carotid [Left Carotid Bruit] : no bruit heard over the left carotid [Right Femoral Bruit] : no bruit heard over the right femoral artery [Left Femoral Bruit] : no bruit heard over the left femoral artery [Bruit] : no bruit heard

## 2023-06-06 NOTE — DISCUSSION/SUMMARY
[FreeTextEntry1] : Thierno has multiple medical problems, including premature atherosclerotic heart disease and paroxysmal atrial fibrillation he is s/p pulmonary vein isolation.\par \par At this point, compliance is a major issue.  He stopped or did not start multiple medicines that he was prescribed in the hospital.  We had an extensive conversation about the risks of that.  He will resume amlodipine.  He will try to resume Ozempic, noting some financial concerns.  He will increase hydralazine up to 25 mg 3 times daily.  He will continue furosemide.\par \par He will come back and see me in the office in about 6 weeks.  I am hopeful that he will be compliant at that time.

## 2023-06-06 NOTE — CARDIOLOGY SUMMARY
[No Ischemia] : no Ischemia [LVEF ___%] : LVEF [unfilled]% [None] : no pulmonary hypertension [Enlarged] : enlarged LA size [Mild] : mild mitral regurgitation [___] : [unfilled] [de-identified] : April 24, 2023 sinus bradycardia, right bundle branch block, left axis deviation  \par June 6, 2023 sinus bradycardia, right bundle branch block, left axis deviation

## 2023-07-27 ENCOUNTER — NON-APPOINTMENT (OUTPATIENT)
Age: 63
End: 2023-07-27

## 2023-07-27 ENCOUNTER — APPOINTMENT (OUTPATIENT)
Dept: CARDIOLOGY | Facility: CLINIC | Age: 63
End: 2023-07-27
Payer: COMMERCIAL

## 2023-07-27 VITALS
WEIGHT: 268 LBS | BODY MASS INDEX: 38.37 KG/M2 | SYSTOLIC BLOOD PRESSURE: 185 MMHG | OXYGEN SATURATION: 98 % | DIASTOLIC BLOOD PRESSURE: 83 MMHG | HEART RATE: 62 BPM | HEIGHT: 70 IN

## 2023-07-27 VITALS — SYSTOLIC BLOOD PRESSURE: 160 MMHG | DIASTOLIC BLOOD PRESSURE: 90 MMHG

## 2023-07-27 DIAGNOSIS — I51.89 OTHER ILL-DEFINED HEART DISEASES: ICD-10-CM

## 2023-07-27 DIAGNOSIS — I48.0 PAROXYSMAL ATRIAL FIBRILLATION: ICD-10-CM

## 2023-07-27 DIAGNOSIS — I10 ESSENTIAL (PRIMARY) HYPERTENSION: ICD-10-CM

## 2023-07-27 DIAGNOSIS — I25.10 ATHEROSCLEROTIC HEART DISEASE OF NATIVE CORONARY ARTERY W/OUT ANGINA PECTORIS: ICD-10-CM

## 2023-07-27 PROCEDURE — 99214 OFFICE O/P EST MOD 30 MIN: CPT | Mod: 25

## 2023-07-27 PROCEDURE — 93000 ELECTROCARDIOGRAM COMPLETE: CPT

## 2023-07-27 RX ORDER — ASPIRIN ENTERIC COATED TABLETS 81 MG 81 MG/1
81 TABLET, DELAYED RELEASE ORAL
Qty: 90 | Refills: 3 | Status: DISCONTINUED | COMMUNITY
Start: 2023-05-16 | End: 2023-07-27

## 2023-07-27 RX ORDER — CLOPIDOGREL 75 MG/1
75 TABLET, FILM COATED ORAL
Refills: 0 | Status: ACTIVE | COMMUNITY

## 2023-07-27 RX ORDER — HYDRALAZINE HYDROCHLORIDE 25 MG/1
25 TABLET ORAL 3 TIMES DAILY
Qty: 270 | Refills: 3 | Status: ACTIVE | COMMUNITY
Start: 2023-04-24 | End: 1900-01-01

## 2023-07-27 RX ORDER — FUROSEMIDE 40 MG/1
40 TABLET ORAL DAILY
Qty: 90 | Refills: 3 | Status: ACTIVE | COMMUNITY
Start: 1900-01-01 | End: 1900-01-01

## 2023-07-27 RX ORDER — ATORVASTATIN CALCIUM 80 MG/1
80 TABLET, FILM COATED ORAL
Qty: 90 | Refills: 3 | Status: ACTIVE | COMMUNITY
Start: 1900-01-01 | End: 1900-01-01

## 2023-07-27 RX ORDER — LOSARTAN POTASSIUM 100 MG/1
100 TABLET, FILM COATED ORAL
Qty: 90 | Refills: 3 | Status: ACTIVE | COMMUNITY
Start: 2019-01-03 | End: 1900-01-01

## 2023-07-27 RX ORDER — AMLODIPINE BESYLATE 10 MG/1
10 TABLET ORAL DAILY
Qty: 90 | Refills: 3 | Status: ACTIVE | COMMUNITY
Start: 2019-01-23 | End: 1900-01-01

## 2023-07-27 NOTE — PHYSICAL EXAM
[Well Developed] : well developed [Well Nourished] : well nourished [No Acute Distress] : no acute distress [Normal Venous Pressure] : normal venous pressure [No Carotid Bruit] : no carotid bruit [Normal S1, S2] : normal S1, S2 [No Rub] : no rub [No Pitting Edema] : no pitting edema present [No Abnormalities] : the abdominal aorta was not enlarged and no bruit was heard [Good Air Entry] : good air entry [Clear Lung Fields] : clear lung fields [No Respiratory Distress] : no respiratory distress  [Soft] : abdomen soft [Non Tender] : non-tender [No Masses/organomegaly] : no masses/organomegaly [Normal Bowel Sounds] : normal bowel sounds [Normal Gait] : normal gait [No Edema] : no edema [No Cyanosis] : no cyanosis [No Clubbing] : no clubbing [No Varicosities] : no varicosities [No Rash] : no rash [No Skin Lesions] : no skin lesions [Moves all extremities] : moves all extremities [No Focal Deficits] : no focal deficits [Normal Speech] : normal speech [Alert and Oriented] : alert and oriented [Normal memory] : normal memory [General Appearance - Well Developed] : well developed [Normal Appearance] : normal appearance [Well Groomed] : well groomed [General Appearance - Well Nourished] : well nourished [No Deformities] : no deformities [General Appearance - In No Acute Distress] : no acute distress [Normal Conjunctiva] : the conjunctiva exhibited no abnormalities [Eyelids - No Xanthelasma] : the eyelids demonstrated no xanthelasmas [Normal Oral Mucosa] : normal oral mucosa [No Oral Pallor] : no oral pallor [No Oral Cyanosis] : no oral cyanosis [Normal Jugular Venous A Waves Present] : normal jugular venous A waves present [Normal Jugular Venous V Waves Present] : normal jugular venous V waves present [No Jugular Venous Rose A Waves] : no jugular venous rose A waves [Respiration, Rhythm And Depth] : normal respiratory rhythm and effort [Exaggerated Use Of Accessory Muscles For Inspiration] : no accessory muscle use [Auscultation Breath Sounds / Voice Sounds] : lungs were clear to auscultation bilaterally [Abdomen Soft] : soft [Abdomen Tenderness] : non-tender [Abdomen Mass (___ Cm)] : no abdominal mass palpated [Abnormal Walk] : normal gait [Gait - Sufficient For Exercise Testing] : the gait was sufficient for exercise testing [Nail Clubbing] : no clubbing of the fingernails [Cyanosis, Localized] : no localized cyanosis [Petechial Hemorrhages (___cm)] : no petechial hemorrhages [Skin Color & Pigmentation] : normal skin color and pigmentation [] : no rash [No Venous Stasis] : no venous stasis [Skin Lesions] : no skin lesions [No Skin Ulcers] : no skin ulcer [No Xanthoma] : no  xanthoma was observed [Oriented To Time, Place, And Person] : oriented to person, place, and time [Affect] : the affect was normal [Mood] : the mood was normal [No Anxiety] : not feeling anxious [Normal] : normal [Rhythm Regular] : regular [Normal Rate] : normal [Normal S1] : normal S1 [Normal S2] : normal S2 [No Gallop] : no gallop heard [No Murmur] : no murmurs heard [2+] : left 2+ [___ +] : bilateral [unfilled]U+ pretibial pitting edema [FreeTextEntry1] : Obese [S3] : no S3 [S4] : no S4 [Right Carotid Bruit] : no bruit heard over the right carotid [Left Carotid Bruit] : no bruit heard over the left carotid [Right Femoral Bruit] : no bruit heard over the right femoral artery [Left Femoral Bruit] : no bruit heard over the left femoral artery [Bruit] : no bruit heard

## 2023-07-27 NOTE — CARDIOLOGY SUMMARY
[No Ischemia] : no Ischemia [LVEF ___%] : LVEF [unfilled]% [None] : no pulmonary hypertension [Enlarged] : enlarged LA size [Mild] : mild mitral regurgitation [___] : [unfilled] [de-identified] : April 24, 2023 sinus bradycardia, right bundle branch block, left axis deviation  \par June 6, 2023 sinus bradycardia, right bundle branch block, left axis deviation\par 7/27/23 sinus rhythm RBBB [de-identified] : 4/2023\par EF 54%\par mod dilated LV \par grade 3 diastolic \par LA dilated\par mild-mod MR \par mod TR\par PASP 38-borderline PHTN [de-identified] : 5/1/23\par severe 90% lesion distal 1/3 of SVG>ramus, s/p IVUS guided BRIANDA to distal 1/3 SVG to ramus [de-identified] : cabg 2007

## 2023-07-27 NOTE — REASON FOR VISIT
[Symptom and Test Evaluation] : symptom and test evaluation [CV Risk Factors and Non-Cardiac Disease] : CV risk factors and non-cardiac disease [Arrhythmia/ECG Abnorrmalities] : arrhythmia/ECG abnormalities [Hyperlipidemia] : hyperlipidemia [Follow-Up - Clinic] : a clinic follow-up of [Atrial Fibrillation] : atrial fibrillation [Coronary Artery Disease] : coronary artery disease [Dyspnea] : dyspnea [Hypertension] : hypertension

## 2023-07-27 NOTE — DISCUSSION/SUMMARY
[EKG obtained to assist in diagnosis and management of assessed problem(s)] : EKG obtained to assist in diagnosis and management of assessed problem(s) [FreeTextEntry1] : Thierno has multiple medical problems, including premature atherosclerotic heart disease and paroxysmal atrial fibrillation he is s/p pulmonary vein isolation.\par He is status post hospitalization in May and drug-eluting stent placement on May 1, 2023.\par He arrives for follow-up and medication reconciliation.\par \par He arrives today overall feeling well. In no acute distress.\par HE still has compliance issues.  At last visit  We had an extensive conversation about the risks of that.  He will resume all of his medications.  PRescriptions sent for all amlodipine 10mg, hydralazine 25mg TID, losartan 100mg dialy. Furosemide 40mg daily with KCL 20meq daily.\par \par He knows the importance Plavix given his recent stent.  He will continue daily, prescription sent to the pharmacy.  He will remain off aspirin.  \par He will continue atorvastatin 80 mg for continued lipid management.  Most recent LDL was 59.\par \par He is maintaining sinus rhythm.  He will continue sotalol 120 mg tablet twice a day.  He will continue Eliquis 5 mg twice a day for thromboembolic prevention.  Prescription sent to the pharmacy for these medications as well.\par \par Lifestyle modifications again reinforced including diet, exercise, weight loss.  He was encouraged to follow-up with endocrinology for diabetes management.  He will continue insulin dosing as prescribed.\par \par He will come back and see me in the office in about 4 months  I am hopeful that he will be compliant at that time.

## 2023-07-27 NOTE — ADDENDUM
[FreeTextEntry1] : compliance remains an issue\par not on losartan since monday\par not taking amlodipine or hydralazine as required\par not taking sotalol as prescribed\par reports compliance with dapt

## 2023-07-27 NOTE — REVIEW OF SYSTEMS
Per order of Senait Gee NP a post void residual was done via Bladder scanner.  PVR was 7 cc.          [Feeling Fatigued] : feeling fatigued [Negative] : Heme/Lymph [Dyspnea on exertion] : dyspnea during exertion [Lower Ext Edema] : lower extremity edema [SOB] : no shortness of breath [Chest Discomfort] : no chest discomfort [Leg Claudication] : no intermittent leg claudication [Palpitations] : no palpitations [Orthopnea] : no orthopnea [PND] : no PND [Syncope] : no syncope [FreeTextEntry9] : leg weakness

## 2023-07-27 NOTE — HISTORY OF PRESENT ILLNESS
[FreeTextEntry1] : Thienro Hassan presented to the office today for a followup evaluation. He was last seen in the office 6 weeks ago.\par \par He is now 63 years old, with a history of multiple medical problems. He has a history of coronary artery disease, status post PCI as well as coronary bypass. His coronary bypass was in 2007, and he has had PCI in 2009, with a bare-metal stents placed to the saphenous vein graft to his ramus intermedius. He has a history of paroxysmal atrial fibrillation, generally maintained in sinus rhythm on warfarin and sotalol. In 2016, he had increasingly frequent episodes of atrial fibrillation, but did not stabilize. He continued to have an intermittently tachycardic ventricular response despite the addition of diltiazem, which he did not like very much. Eventually, he was admitted to the hospital, and was referred to [facility] where he had pulmonary vein isolation.\par \par Because of recurrent tachycardia, he was started on diltiazem. Over time, he developed atrial flutter. This was relatively frequent initially after his ablation, but had seemingly become less frequent. He had symptoms suggesting tachy lara syndrome, but he never went to electrophysiology. This went away on its own, and he now refuses to take diltiazem because he feels that this caused his issues overall.\par \par He was evaluated March 29, 2023 reporting symptoms of significant fatigue with activity and uncontrolled blood pressure.  His symptoms were suspicious for angina, which had been a suspicion we had had for some time, but had not addressed due to his fears and poor follow-up.  His blood pressure was elevated at least in part due to his discontinuing amlodipine on his own.  I resumed amlodipine, given nitroglycerin to use as needed, and I recommended several examinations, including an echocardiogram and nuclear stress testing.\par \par Echocardiography was performed April 7, 2023.  This revealed a normal ejection fraction of 54%, with mild to moderate mitral regurgitation.  Pharmacologic nuclear stress testing revealed a medium sized mild to moderate defect in the basal anterior and anteroseptal walls, as well as a small sized mild to moderate defect in the mid inferolateral wall.  The stress ejection fraction was 51%.  Given his symptoms and these findings, I recommended that we discuss things in the office.  I saw him in the office, and we agreed to pursue cardiac catheterization.  Catheterization was performed May 1, 2023.  This revealed severe disease of the native coronary arteries.  There was a patient sequential graft of the LIMA touching down to the first diagonal in the mid LAD, as well as a patent SVG to the right PDA.  There was a patent prior stent in the SVG to the ramus.  There was a severe, 90% lesion distal to the stent in the distal third of the vein graft.  Filling pressures were elevated, and he was diuresed.  2 days later on May 3, 2023 he had successful IVUS guided drug-eluting stent placement to the distal third of the SVG to the ramus.  His LVEDP was noted to still be elevated at 35 mmHg at that time.  He was discharged on triple therapy, including aspirin, Plavix and Eliquis, for 2 weeks, and then was expected to be on Plavix and Eliquis.\par \par He presents for follow up .  He has not been taking his medications as prescribed due to running out. He states that the pharmacy would not refill without having a followup visit. He has not been taking his B/P medications since monday. He had not been taking hydralazine 3 times a day.\par He has been better regulating his sugars , using lantus and humalog.  He has not been taking Ozempic.  .  He has been compliant with anticoagulants.

## 2023-08-26 ENCOUNTER — OFFICE (OUTPATIENT)
Dept: URBAN - METROPOLITAN AREA CLINIC 109 | Facility: CLINIC | Age: 63
Setting detail: OPHTHALMOLOGY
End: 2023-08-26
Payer: COMMERCIAL

## 2023-08-26 DIAGNOSIS — H04.123: ICD-10-CM

## 2023-08-26 DIAGNOSIS — H02.834: ICD-10-CM

## 2023-08-26 DIAGNOSIS — H02.831: ICD-10-CM

## 2023-08-26 DIAGNOSIS — H26.493: ICD-10-CM

## 2023-08-26 DIAGNOSIS — H43.393: ICD-10-CM

## 2023-08-26 DIAGNOSIS — H02.832: ICD-10-CM

## 2023-08-26 DIAGNOSIS — H52.7: ICD-10-CM

## 2023-08-26 DIAGNOSIS — E11.9: ICD-10-CM

## 2023-08-26 DIAGNOSIS — E11.3293: ICD-10-CM

## 2023-08-26 DIAGNOSIS — H02.835: ICD-10-CM

## 2023-08-26 PROBLEM — H26.491 POSTERIOR CAPSULAR OPACIFICATION; RIGHT EYE: Status: ACTIVE | Noted: 2023-08-26

## 2023-08-26 PROCEDURE — 92015 DETERMINE REFRACTIVE STATE: CPT | Performed by: OPHTHALMOLOGY

## 2023-08-26 PROCEDURE — 92014 COMPRE OPH EXAM EST PT 1/>: CPT | Performed by: OPHTHALMOLOGY

## 2023-08-26 ASSESSMENT — REFRACTION_MANIFEST
OD_ADD: +1.50
OS_ADD: +1.50
OS_CYLINDER: -1.50
OS_VA1: 20/20
OD_SPHERE: +0.75
OS_SPHERE: +1.50
OD_VA1: 20/20-2
OS_AXIS: 80

## 2023-08-26 ASSESSMENT — SPHEQUIV_DERIVED
OD_SPHEQUIV: 1
OS_SPHEQUIV: 0.75
OS_SPHEQUIV: 0.75

## 2023-08-26 ASSESSMENT — REFRACTION_AUTOREFRACTION
OS_AXIS: 74
OS_SPHERE: +1.25
OD_CYLINDER: -0.50
OD_SPHERE: +1.25
OD_AXIS: 97
OS_CYLINDER: -1.00

## 2023-08-26 ASSESSMENT — TONOMETRY
OD_IOP_MMHG: 17
OS_IOP_MMHG: 17

## 2023-08-26 ASSESSMENT — CONFRONTATIONAL VISUAL FIELD TEST (CVF)
OD_FINDINGS: FULL
OS_FINDINGS: FULL

## 2023-08-26 ASSESSMENT — SUPERFICIAL PUNCTATE KERATITIS (SPK)
OS_SPK: T
OD_SPK: ABSENT

## 2023-08-26 ASSESSMENT — VISUAL ACUITY
OS_BCVA: 20/25
OD_BCVA: 20/30

## 2024-01-03 ENCOUNTER — APPOINTMENT (OUTPATIENT)
Dept: CARDIOLOGY | Facility: CLINIC | Age: 64
End: 2024-01-03

## 2024-01-30 ENCOUNTER — APPOINTMENT (OUTPATIENT)
Dept: CARDIOLOGY | Facility: CLINIC | Age: 64
End: 2024-01-30

## 2024-02-09 ENCOUNTER — RX RENEWAL (OUTPATIENT)
Age: 64
End: 2024-02-09

## 2024-02-09 RX ORDER — APIXABAN 5 MG/1
5 TABLET, FILM COATED ORAL
Qty: 180 | Refills: 1 | Status: ACTIVE | COMMUNITY
Start: 2023-05-16

## 2025-03-18 NOTE — PATIENT PROFILE ADULT - FUNCTIONAL ASSESSMENT - BASIC MOBILITY 5.
Orthosis    Diagnosis:   1. Primary osteoarthritis of both first carpometacarpal joints  Ambulatory Referral to Occupational Therapy        Indication: Arthroplasty     Location: Left  thumb  Supplies: Custom Fit Orthotic  Orthosis type: Hand-Based SPICA  Wearing Schedule: Remove for hygiene only and Remove for HEP  Describe Position: function    Precautions: Universal (skin contact/breakdown) and s/p weilby    Patient or Caregiver expresses understanding of wearing Schedule and Precautions? Yes  Patient or Caregiver able to don/doff orthotic independently?Yes    Written orders provided to patient? Yes  Orders Obtained: Written  Orders Obtained from: Dr Donaldson    Return for evaluation and treatment Yes  
4 = No assist / stand by assistance